# Patient Record
Sex: FEMALE | Race: OTHER | HISPANIC OR LATINO | Employment: FULL TIME | URBAN - METROPOLITAN AREA
[De-identification: names, ages, dates, MRNs, and addresses within clinical notes are randomized per-mention and may not be internally consistent; named-entity substitution may affect disease eponyms.]

---

## 2020-02-28 ENCOUNTER — OFFICE VISIT (OUTPATIENT)
Dept: FAMILY MEDICINE CLINIC | Facility: CLINIC | Age: 40
End: 2020-02-28
Payer: COMMERCIAL

## 2020-02-28 VITALS
OXYGEN SATURATION: 97 % | SYSTOLIC BLOOD PRESSURE: 120 MMHG | HEIGHT: 61 IN | BODY MASS INDEX: 25.71 KG/M2 | TEMPERATURE: 98.3 F | DIASTOLIC BLOOD PRESSURE: 80 MMHG | WEIGHT: 136.2 LBS | HEART RATE: 81 BPM

## 2020-02-28 DIAGNOSIS — Z12.4 SCREENING FOR CERVICAL CANCER: ICD-10-CM

## 2020-02-28 DIAGNOSIS — Z13.220 SCREENING FOR CHOLESTEROL LEVEL: ICD-10-CM

## 2020-02-28 DIAGNOSIS — Z13.1 ENCOUNTER FOR SCREENING EXAMINATION FOR IMPAIRED GLUCOSE REGULATION AND DIABETES MELLITUS: ICD-10-CM

## 2020-02-28 DIAGNOSIS — R53.83 OTHER FATIGUE: ICD-10-CM

## 2020-02-28 DIAGNOSIS — Z23 NEEDS FLU SHOT: ICD-10-CM

## 2020-02-28 DIAGNOSIS — Z11.4 ENCOUNTER FOR SCREENING FOR HIV: ICD-10-CM

## 2020-02-28 DIAGNOSIS — Z00.01 ENCOUNTER FOR WELL ADULT EXAM WITH ABNORMAL FINDINGS: Primary | ICD-10-CM

## 2020-02-28 PROCEDURE — 90471 IMMUNIZATION ADMIN: CPT | Performed by: NURSE PRACTITIONER

## 2020-02-28 PROCEDURE — 99395 PREV VISIT EST AGE 18-39: CPT | Performed by: NURSE PRACTITIONER

## 2020-02-28 PROCEDURE — 90686 IIV4 VACC NO PRSV 0.5 ML IM: CPT | Performed by: NURSE PRACTITIONER

## 2020-02-28 RX ORDER — PHENTERMINE HYDROCHLORIDE 37.5 MG/1
37.5 TABLET ORAL
COMMUNITY
Start: 2020-01-06

## 2020-02-28 NOTE — PROGRESS NOTES
Assessment/Plan:    Encounter for well adult exam with abnormal findings  Patient is here for physical exam we find this visit without any distress or abnormalities  We recommended exercise at least three and 0 5 hours per week and healthy diet with a low carbohydrate and low saturated fat  Began to follow up in one year for regular physical exams  BMI Counseling: Body mass index is 25 73 kg/m²  The BMI is above normal  Nutrition recommendations include reducing portion sizes, 3-5 servings of fruits/vegetables daily, consuming healthier snacks, reducing intake of saturated fat and trans fat and reducing intake of cholesterol  Exercise recommendations include moderate aerobic physical activity for 150 minutes/week  Diagnoses and all orders for this visit:    Encounter for well adult exam with abnormal findings    Screening for cervical cancer  -     Ambulatory referral to Gynecology; Future    Needs flu shot  -     influenza vaccine, [id number], quadrivalent, 0 5 mL, preservative-free, for adult and pediatric patients 6 mos+ (AFLURIA, FLUARIX, FLULAVAL, FLUZONE)    Other fatigue  -     CBC and differential; Future  -     TSH, 3rd generation with Free T4 reflex; Future    Encounter for screening examination for impaired glucose regulation and diabetes mellitus  -     Comprehensive metabolic panel; Future    Screening for cholesterol level  -     Lipid panel; Future    Encounter for screening for HIV  -     HIV 1/2 AG-AB combo; Future    Other orders  -     Cancel: Ambulatory referral to Obstetrics / Gynecology; Future  -     Cancel: TDAP VACCINE GREATER THAN OR EQUAL TO 8YO IM  -     phentermine (ADIPEX-P) 37 5 MG tablet; Take 37 5 mg by mouth daily before breakfast          Subjective:      Patient ID: Carol Allred is a 44 y o  female  44year old female patient here for a physical exam  Last dental exam is up to date, and eye exam has an eye doctor and will go see one  Eating healthy per patient   Doesn't exercise as much as she should  Walks per patient  Sleeps well at night  LMP: currently on menses  The following portions of the patient's history were reviewed and updated as appropriate: allergies, current medications, past family history, past medical history, past social history, past surgical history and problem list     Review of Systems   Constitutional: Negative  Negative for appetite change, fatigue and fever  HENT: Negative  Eyes: Negative  Respiratory: Negative  Negative for cough, shortness of breath and wheezing  Cardiovascular: Negative  Negative for chest pain and palpitations  Gastrointestinal: Negative  Negative for abdominal distention  Endocrine: Negative  Genitourinary: Negative  Musculoskeletal: Negative  Negative for arthralgias and gait problem  Skin: Negative  Allergic/Immunologic: Negative  Neurological: Negative  Negative for dizziness and headaches  Hematological: Negative  Negative for adenopathy  Does not bruise/bleed easily  Psychiatric/Behavioral: Negative  Objective:      /80 (BP Location: Left arm, Patient Position: Supine, Cuff Size: Adult)   Pulse 81   Temp 98 3 °F (36 8 °C) (Oral)   Ht 5' 1" (1 549 m)   Wt 61 8 kg (136 lb 3 2 oz)   LMP 02/27/2020 (Exact Date)   SpO2 97%   BMI 25 73 kg/m²          Physical Exam   Constitutional: She is oriented to person, place, and time  She appears well-developed and well-nourished  No distress  HENT:   Head: Normocephalic and atraumatic  Right Ear: External ear normal    Left Ear: External ear normal    Nose: Nose normal    Mouth/Throat: Oropharynx is clear and moist    Eyes: Pupils are equal, round, and reactive to light  Conjunctivae and EOM are normal  Right eye exhibits no discharge  Left eye exhibits no discharge  Neck: Normal range of motion  Neck supple  No thyromegaly present     Cardiovascular: Normal rate, regular rhythm, normal heart sounds and intact distal pulses  Exam reveals no gallop and no friction rub  No murmur heard  Pulmonary/Chest: Effort normal and breath sounds normal  No respiratory distress  She has no wheezes  Abdominal: Soft  Bowel sounds are normal  She exhibits no distension  There is no tenderness  Musculoskeletal: Normal range of motion  Neurological: She is alert and oriented to person, place, and time  She has normal reflexes  Skin: Skin is warm and dry  Capillary refill takes less than 2 seconds  She is not diaphoretic  Psychiatric: She has a normal mood and affect  Her behavior is normal  Judgment and thought content normal    Nursing note and vitals reviewed

## 2020-03-02 PROBLEM — Z00.01 ENCOUNTER FOR WELL ADULT EXAM WITH ABNORMAL FINDINGS: Status: ACTIVE | Noted: 2020-03-02

## 2020-03-20 ENCOUNTER — OFFICE VISIT (OUTPATIENT)
Dept: FAMILY MEDICINE CLINIC | Facility: CLINIC | Age: 40
End: 2020-03-20
Payer: COMMERCIAL

## 2020-03-20 VITALS
BODY MASS INDEX: 25.19 KG/M2 | SYSTOLIC BLOOD PRESSURE: 130 MMHG | HEIGHT: 61 IN | TEMPERATURE: 98.3 F | HEART RATE: 87 BPM | WEIGHT: 133.4 LBS | DIASTOLIC BLOOD PRESSURE: 80 MMHG | OXYGEN SATURATION: 97 %

## 2020-03-20 DIAGNOSIS — J30.1 NON-SEASONAL ALLERGIC RHINITIS DUE TO POLLEN: Primary | ICD-10-CM

## 2020-03-20 PROCEDURE — 3008F BODY MASS INDEX DOCD: CPT | Performed by: NURSE PRACTITIONER

## 2020-03-20 PROCEDURE — 1036F TOBACCO NON-USER: CPT | Performed by: NURSE PRACTITIONER

## 2020-03-20 PROCEDURE — 99214 OFFICE O/P EST MOD 30 MIN: CPT | Performed by: NURSE PRACTITIONER

## 2020-03-20 NOTE — PROGRESS NOTES
Assessment/Plan:    Non-seasonal allergic rhinitis due to pollen  I am recommending patient avoid triggers  Minimize exposure to irritants like smoke, perfumes, cosmetics, hair spray, and other odors  Use of nasal saline spray and cleaning out nares as much as possible  Patient to take OTC flonase she has  Recommended taking a daily oral anti-histamine but patient states these at times do not work, sometimes will take steroid injections for the season  Today symptoms have improved per patient  Diagnoses and all orders for this visit:    Non-seasonal allergic rhinitis due to pollen          Subjective:      Patient ID: Oscar Schmitt is a 44 y o  female  44year old female patient here for a follow up on her recent cough and sore throat  Recently came back from [place] on Tuesday  Pt does admit to suffering from allergies and today was sneezing somewhat  Denies any fever or shortness of breath  Overall she feels well  Will continue to take flonase and daily anti-histamine  Cough   This is a new problem  The current episode started yesterday  The problem has been waxing and waning  The problem occurs constantly  The cough is productive of sputum  Associated symptoms include postnasal drip, rhinorrhea and a sore throat  Pertinent negatives include no chest pain, chills, ear congestion, ear pain, fever, headaches, heartburn, nasal congestion, shortness of breath, weight loss or wheezing  Associated symptoms comments: sneezing  The symptoms are aggravated by cold air and lying down  Risk factors for lung disease include travel ([facility] on tuesday)  She has tried OTC cough suppressant for the symptoms  The treatment provided moderate relief  Her past medical history is significant for environmental allergies  There is no history of asthma, bronchitis or pneumonia  Sore Throat    This is a new problem  The current episode started yesterday  The problem has been gradually worsening  There has been no fever  The fever has been present for less than 1 day  The pain is at a severity of 0/10  The patient is experiencing no pain  Associated symptoms include coughing  Pertinent negatives include no ear discharge, ear pain, headaches, shortness of breath, swollen glands, trouble swallowing or vomiting  She has had no exposure to strep or mono  She has tried cool liquids for the symptoms  The treatment provided moderate relief  The following portions of the patient's history were reviewed and updated as appropriate: allergies, current medications, past family history, past medical history, past social history, past surgical history and problem list     Review of Systems   Constitutional: Negative  Negative for chills, fever and weight loss  HENT: Positive for postnasal drip, rhinorrhea and sore throat  Negative for ear discharge, ear pain and trouble swallowing  Eyes: Negative  Respiratory: Positive for cough  Negative for chest tightness, shortness of breath and wheezing  Cardiovascular: Negative  Negative for chest pain and palpitations  Gastrointestinal: Negative  Negative for abdominal distention, heartburn and vomiting  Endocrine: Negative  Genitourinary: Negative  Musculoskeletal: Negative  Negative for arthralgias  Skin: Negative  Allergic/Immunologic: Positive for environmental allergies  Neurological: Negative  Negative for headaches  Hematological: Negative  Negative for adenopathy  Does not bruise/bleed easily  Psychiatric/Behavioral: Negative  Objective:      /80 (BP Location: Left arm, Patient Position: Sitting, Cuff Size: Adult)   Pulse 87   Temp 98 3 °F (36 8 °C) (Oral)   Ht 5' 1" (1 549 m)   Wt 60 5 kg (133 lb 6 4 oz)   LMP 02/27/2020 (Exact Date)   SpO2 97%   BMI 25 21 kg/m²          Physical Exam   Constitutional: She is oriented to person, place, and time  She appears well-developed and well-nourished  Non-toxic appearance   She does not appear ill  No distress  HENT:   Head: Normocephalic and atraumatic  Right Ear: Hearing and external ear normal  A middle ear effusion is present  Left Ear: Hearing and external ear normal  A middle ear effusion is present  Nose: Rhinorrhea present  Mouth/Throat: Uvula is midline  Mucous membranes are pale  Posterior oropharyngeal erythema present  Eyes: Pupils are equal, round, and reactive to light  EOM are normal    Neck: Normal range of motion  Neck supple  Cardiovascular: Normal rate, regular rhythm and normal heart sounds  Pulmonary/Chest: Effort normal and breath sounds normal  No respiratory distress  She has no wheezes  She has no rales  Abdominal: Soft  Bowel sounds are normal    Musculoskeletal: Normal range of motion  Lymphadenopathy:     She has no cervical adenopathy  Neurological: She is alert and oriented to person, place, and time  She has normal reflexes  Skin: Skin is warm and dry  Capillary refill takes less than 2 seconds  Psychiatric: She has a normal mood and affect  Her behavior is normal  Thought content normal    Nursing note and vitals reviewed

## 2020-03-20 NOTE — LETTER
March 20, 2020     Patient: Hulda Mcburney   YOB: 1980   Date of Visit: 3/20/2020       To Whom it May Concern:    Faustino Santos is under my professional care  She was seen in my office on 3/20/2020  She may return to work on 3/23/20 without any restrictions to full duty  If you have any questions or concerns, please don't hesitate to call           Sincerely,          SALVADOR Chanel        CC: No Recipients

## 2020-03-20 NOTE — ASSESSMENT & PLAN NOTE
I am recommending patient avoid triggers  Minimize exposure to irritants like smoke, perfumes, cosmetics, hair spray, and other odors  Use of nasal saline spray and cleaning out nares as much as possible  Patient to take OTC flonase she has  Recommended taking a daily oral anti-histamine but patient states these at times do not work, sometimes will take steroid injections for the season  Today symptoms have improved per patient

## 2020-03-23 LAB
ALBUMIN SERPL-MCNC: 4.3 G/DL (ref 3.6–5.1)
ALBUMIN/GLOB SERPL: 1.4 (CALC) (ref 1–2.5)
ALP SERPL-CCNC: 64 U/L (ref 31–125)
ALT SERPL-CCNC: 24 U/L (ref 6–29)
AST SERPL-CCNC: 16 U/L (ref 10–30)
BASOPHILS # BLD AUTO: 43 CELLS/UL (ref 0–200)
BASOPHILS NFR BLD AUTO: 0.5 %
BILIRUB SERPL-MCNC: 0.5 MG/DL (ref 0.2–1.2)
BUN SERPL-MCNC: 13 MG/DL (ref 7–25)
BUN/CREAT SERPL: NORMAL (CALC) (ref 6–22)
CALCIUM SERPL-MCNC: 9.1 MG/DL (ref 8.6–10.2)
CHLORIDE SERPL-SCNC: 105 MMOL/L (ref 98–110)
CHOLEST SERPL-MCNC: 169 MG/DL
CHOLEST/HDLC SERPL: 3.2 (CALC)
CO2 SERPL-SCNC: 27 MMOL/L (ref 20–32)
CREAT SERPL-MCNC: 0.85 MG/DL (ref 0.5–1.1)
EOSINOPHIL # BLD AUTO: 102 CELLS/UL (ref 15–500)
EOSINOPHIL NFR BLD AUTO: 1.2 %
ERYTHROCYTE [DISTWIDTH] IN BLOOD BY AUTOMATED COUNT: 12.4 % (ref 11–15)
GLOBULIN SER CALC-MCNC: 3.1 G/DL (CALC) (ref 1.9–3.7)
GLUCOSE SERPL-MCNC: 92 MG/DL (ref 65–99)
HCT VFR BLD AUTO: 41.4 % (ref 35–45)
HDLC SERPL-MCNC: 53 MG/DL
HGB BLD-MCNC: 13.8 G/DL (ref 11.7–15.5)
HIV 1+2 AB+HIV1 P24 AG SERPL QL IA: NORMAL
LDLC SERPL CALC-MCNC: 96 MG/DL (CALC)
LYMPHOCYTES # BLD AUTO: 2185 CELLS/UL (ref 850–3900)
LYMPHOCYTES NFR BLD AUTO: 25.7 %
MCH RBC QN AUTO: 30.1 PG (ref 27–33)
MCHC RBC AUTO-ENTMCNC: 33.3 G/DL (ref 32–36)
MCV RBC AUTO: 90.2 FL (ref 80–100)
MONOCYTES # BLD AUTO: 408 CELLS/UL (ref 200–950)
MONOCYTES NFR BLD AUTO: 4.8 %
NEUTROPHILS # BLD AUTO: 5763 CELLS/UL (ref 1500–7800)
NEUTROPHILS NFR BLD AUTO: 67.8 %
NONHDLC SERPL-MCNC: 116 MG/DL (CALC)
PLATELET # BLD AUTO: 321 THOUSAND/UL (ref 140–400)
PMV BLD REES-ECKER: 10.6 FL (ref 7.5–12.5)
POTASSIUM SERPL-SCNC: 4.9 MMOL/L (ref 3.5–5.3)
PROT SERPL-MCNC: 7.4 G/DL (ref 6.1–8.1)
RBC # BLD AUTO: 4.59 MILLION/UL (ref 3.8–5.1)
SL AMB EGFR AFRICAN AMERICAN: 100 ML/MIN/1.73M2
SL AMB EGFR NON AFRICAN AMERICAN: 86 ML/MIN/1.73M2
SODIUM SERPL-SCNC: 138 MMOL/L (ref 135–146)
TRIGL SERPL-MCNC: 108 MG/DL
TSH SERPL-ACNC: 1.61 MIU/L
WBC # BLD AUTO: 8.5 THOUSAND/UL (ref 3.8–10.8)

## 2020-12-10 ENCOUNTER — LAB (OUTPATIENT)
Dept: LAB | Facility: HOSPITAL | Age: 40
End: 2020-12-10
Payer: COMMERCIAL

## 2020-12-10 ENCOUNTER — TRANSCRIBE ORDERS (OUTPATIENT)
Dept: LAB | Facility: HOSPITAL | Age: 40
End: 2020-12-10

## 2020-12-10 DIAGNOSIS — R53.83 OTHER FATIGUE: ICD-10-CM

## 2020-12-10 DIAGNOSIS — Z11.4 ENCOUNTER FOR SCREENING FOR HIV: ICD-10-CM

## 2020-12-10 DIAGNOSIS — Z13.220 SCREENING FOR CHOLESTEROL LEVEL: ICD-10-CM

## 2020-12-10 DIAGNOSIS — Z01.812 PRE-OPERATIVE LABORATORY EXAMINATION: Primary | ICD-10-CM

## 2020-12-10 DIAGNOSIS — Z13.1 ENCOUNTER FOR SCREENING EXAMINATION FOR IMPAIRED GLUCOSE REGULATION AND DIABETES MELLITUS: ICD-10-CM

## 2020-12-10 LAB
ALBUMIN SERPL BCP-MCNC: 4.2 G/DL (ref 3–5.2)
ALP SERPL-CCNC: 60 U/L (ref 43–122)
ALT SERPL W P-5'-P-CCNC: 16 U/L (ref 9–52)
ANION GAP SERPL CALCULATED.3IONS-SCNC: 7 MMOL/L (ref 5–14)
AST SERPL W P-5'-P-CCNC: 19 U/L (ref 14–36)
B-HCG SERPL-ACNC: <3 MIU/ML
BASOPHILS # BLD AUTO: 0.1 THOUSANDS/ΜL (ref 0–0.1)
BASOPHILS NFR BLD AUTO: 1 % (ref 0–1)
BILIRUB SERPL-MCNC: 0.4 MG/DL
BUN SERPL-MCNC: 17 MG/DL (ref 5–25)
CALCIUM SERPL-MCNC: 9 MG/DL (ref 8.4–10.2)
CHLORIDE SERPL-SCNC: 102 MMOL/L (ref 97–108)
CO2 SERPL-SCNC: 29 MMOL/L (ref 22–30)
CREAT SERPL-MCNC: 0.7 MG/DL (ref 0.6–1.2)
EOSINOPHIL # BLD AUTO: 0.1 THOUSAND/ΜL (ref 0–0.4)
EOSINOPHIL NFR BLD AUTO: 1 % (ref 0–6)
ERYTHROCYTE [DISTWIDTH] IN BLOOD BY AUTOMATED COUNT: 12.6 %
GFR SERPL CREATININE-BSD FRML MDRD: 109 ML/MIN/1.73SQ M
GLUCOSE SERPL-MCNC: 86 MG/DL (ref 70–99)
HCT VFR BLD AUTO: 37.7 % (ref 36–46)
HGB BLD-MCNC: 13 G/DL (ref 12–16)
LYMPHOCYTES # BLD AUTO: 2.3 THOUSANDS/ΜL (ref 0.5–4)
LYMPHOCYTES NFR BLD AUTO: 32 % (ref 25–45)
MCH RBC QN AUTO: 30.9 PG (ref 26–34)
MCHC RBC AUTO-ENTMCNC: 34.5 G/DL (ref 31–36)
MCV RBC AUTO: 90 FL (ref 80–100)
MONOCYTES # BLD AUTO: 0.4 THOUSAND/ΜL (ref 0.2–0.9)
MONOCYTES NFR BLD AUTO: 6 % (ref 1–10)
NEUTROPHILS # BLD AUTO: 4.3 THOUSANDS/ΜL (ref 1.8–7.8)
NEUTS SEG NFR BLD AUTO: 60 % (ref 45–65)
PLATELET # BLD AUTO: 362 THOUSANDS/UL (ref 150–450)
PMV BLD AUTO: 8.1 FL (ref 8.9–12.7)
POTASSIUM SERPL-SCNC: 3.9 MMOL/L (ref 3.6–5)
PROT SERPL-MCNC: 7.8 G/DL (ref 5.9–8.4)
RBC # BLD AUTO: 4.21 MILLION/UL (ref 4–5.2)
SODIUM SERPL-SCNC: 138 MMOL/L (ref 137–147)
TSH SERPL DL<=0.05 MIU/L-ACNC: 1.39 UIU/ML (ref 0.47–4.68)
WBC # BLD AUTO: 7.2 THOUSAND/UL (ref 4.5–11)

## 2020-12-10 PROCEDURE — 84443 ASSAY THYROID STIM HORMONE: CPT

## 2020-12-10 PROCEDURE — 85025 COMPLETE CBC W/AUTO DIFF WBC: CPT

## 2020-12-10 PROCEDURE — 80053 COMPREHEN METABOLIC PANEL: CPT

## 2020-12-10 PROCEDURE — 87389 HIV-1 AG W/HIV-1&-2 AB AG IA: CPT

## 2020-12-10 PROCEDURE — 36415 COLL VENOUS BLD VENIPUNCTURE: CPT

## 2020-12-10 PROCEDURE — 84702 CHORIONIC GONADOTROPIN TEST: CPT

## 2020-12-11 LAB — HIV 1+2 AB+HIV1 P24 AG SERPL QL IA: NORMAL

## 2021-01-03 ENCOUNTER — OFFICE VISIT (OUTPATIENT)
Dept: URGENT CARE | Age: 41
End: 2021-01-03
Payer: COMMERCIAL

## 2021-01-03 VITALS
WEIGHT: 140 LBS | OXYGEN SATURATION: 99 % | HEIGHT: 61 IN | BODY MASS INDEX: 26.43 KG/M2 | HEART RATE: 82 BPM | RESPIRATION RATE: 16 BRPM | TEMPERATURE: 97.7 F

## 2021-01-03 DIAGNOSIS — R43.0 LOSS OF SMELL: Primary | ICD-10-CM

## 2021-01-03 PROCEDURE — U0003 INFECTIOUS AGENT DETECTION BY NUCLEIC ACID (DNA OR RNA); SEVERE ACUTE RESPIRATORY SYNDROME CORONAVIRUS 2 (SARS-COV-2) (CORONAVIRUS DISEASE [COVID-19]), AMPLIFIED PROBE TECHNIQUE, MAKING USE OF HIGH THROUGHPUT TECHNOLOGIES AS DESCRIBED BY CMS-2020-01-R: HCPCS | Performed by: PHYSICIAN ASSISTANT

## 2021-01-03 PROCEDURE — 99213 OFFICE O/P EST LOW 20 MIN: CPT | Performed by: PHYSICIAN ASSISTANT

## 2021-01-03 NOTE — LETTER
January 3, 2021     Patient: Steffi Zayas   YOB: 1980   Date of Visit: 1/3/2021       To Whom It May Concern: It is my medical opinion that Hanna Homans should remain out of work until cleared by physician  If you have any questions or concerns, please don't hesitate to call           Sincerely,        Joslyn Parra PA-C    CC: No Recipients

## 2021-01-03 NOTE — PATIENT INSTRUCTIONS
Patient Instructions   COVID testing initiated  Results may take up to 5-10 days to return, but often come back sooner (2-4 days)     If the patient has a [facility] My Chart account, results may be accessed on line  If the patient does not have the [facility] Chart account, please establish one so results can be accessed  This will be the easiest and quickest way to get a copy of your test results if you require printed documentation  If patient is symptomatic and until results are obtained, home quarantine / self isolation strongly encouraged  If testing is done for screening purposes and patient is not symptomatic, we still recommend masking, social distancing, good hygiene practices be followed  If COVID test is positive, patient / care giver will be contacted by ordering provider or designated staff  If COVID test is positive, please call the primary care provider office to inform of positive test and request follow up evaluation appointment  (Generally, primary care providers are doing telemedicine visits with their positive COVID patients )  If COVID test is positive, please again review all information below  Further questions may be addressed by the primary care provider or the [address] [place] [address] [facility] at [phone]  If the patient / caregiver has not heard about test results or has been unable to access results on the patient My Chart account in a timely fashion, please call the provider's office where test was ordered (or Hot Line if applicable)  to inquire about results  If results are negative and patient / care giver has been found to have already accessed results through the [facility]  [employer] [facility] tat[facility]na, no call will be made  Until results are obtained, home quarantine / self isolation strongly encouraged       If the patient would develop profound weakness, chest pain, shortness of breath please proceed to an emergency room for further evaluation otherwise we do recommend that patient follow-up with their primary care provider in the next 5-7 days if not improving  Symptomatic treatment as needed for symptoms relief based on age / medical status of patient  Things like warm salt water gargles, Tylenol or Ibuprofen (if not contraindicated), drinking plenty of fluids, nasal saline rinses / spray, warm tea with honey (not for patients less than 1 year of age),  etc may provide symptoms relief  [address]     Your healthcare provider and/or public health staff have evaluated you and have determined that you do not need to remain in the hospital at this time  At this time you can be isolated at home where you will be monitored by staff from your local or state health department  You should carefully follow the prevention and isolation steps below until a healthcare provider or local or state health department says that you can return to your normal activities  Stay home except to get medical care     People who are mildly ill with COVID-19 are able to isolate at home during their illness  You should restrict activities outside your home, except for getting medical care  Do not go to work, school, or public areas  Avoid using public transportation, ride-sharing, or taxis  Separate yourself from other people and animals in your home     People: As much as possible, you should stay in a specific room and away from other people in your home  Also, you should use a separate bathroom, if available  Animals: You should restrict contact with pets and other animals while you are sick with COVID-19, just like you would around other people  Although there have not been reports of pets or other animals becoming sick with COVID-19, it is still recommended that people sick with COVID-19 limit contact with animals until more information is known about the virus   When possible, have another member of your household care for your animals while you are sick  If you are sick with COVID-19, avoid contact with your pet, including petting, snuggling, being kissed or licked, and sharing food  If you must care for your pet or be around animals while you are sick, wash your hands before and after you interact with pets and wear a facemask  See COVID-19 and Animals for more information  Call ahead before visiting your doctor     If you have a medical appointment, call the healthcare provider and tell them that you have or may have COVID-19  This will help the healthcare providers office take steps to keep other people from getting infected or exposed  Wear a facemask     You should wear a facemask when you are around other people (e g , sharing a room or vehicle) or pets and before you enter a healthcare providers office  If you are not able to wear a facemask (for example, because it causes trouble breathing), then people who live with you should not stay in the same room with you, or they should wear a facemask if they enter your room  Cover your coughs and sneezes     Cover your mouth and nose with a tissue when you cough or sneeze  Throw used tissues in a lined trash can  Immediately wash your hands with soap and water for at least 20 seconds or, if soap and water are not available, clean your hands with an alcohol-based hand  that contains at least 60% alcohol  Clean your hands often     Wash your hands often with soap and water for at least 20 seconds, especially after blowing your nose, coughing, or sneezing; going to the bathroom; and before eating or preparing food  If soap and water are not readily available, use an alcohol-based hand  with at least 60% alcohol, covering all surfaces of your hands and rubbing them together until they feel dry  Soap and water are the best option if hands are visibly dirty  Avoid touching your eyes, nose, and mouth with unwashed hands       Avoid sharing personal household items     You should not share dishes, drinking glasses, cups, eating utensils, towels, or bedding with other people or pets in your home  After using these items, they should be washed thoroughly with soap and water  Clean all high-touch surfaces everyday     High touch surfaces include counters, tabletops, doorknobs, bathroom fixtures, toilets, phones, keyboards, tablets, and bedside tables  Also, clean any surfaces that may have blood, stool, or body fluids on them  Use a household cleaning spray or wipe, according to the label instructions  Labels contain instructions for safe and effective use of the cleaning product including precautions you should take when applying the product, such as wearing gloves and making sure you have good ventilation during use of the product  Monitor your symptoms     Seek prompt medical attention if your illness is worsening (e g , difficulty breathing)  Before seeking care, call your healthcare provider and tell them that you have, or are being evaluated for, COVID-19  Put on a facemask before you enter the facility  These steps will help the healthcare providers office to keep other people in the office or waiting room from getting infected or exposed  Ask your healthcare provider to call the local or [facility] health department  Persons who are placed under active monitoring or facilitated self-monitoring should follow instructions provided by their local health department or occupational health professionals, as appropriate  If you have a medical emergency and need to call 911, notify the dispatch personnel that you have, or are being evaluated for COVID-19  If possible, put on a facemask before emergency medical services arrive       Discontinuing home isolation     Patients with confirmed COVID-19 should remain under home isolation precautions until the following conditions are met:   § They have had no fever for at least 24 hours (that is one full day of no fever without the use medicine that reduces fevers)  AND  § other symptoms have improved (for example, when their cough or shortness of breath have improved)  AND  § at least 10 days have passed since their symptoms first appeared     Patients with confirmed COVID-19 should also notify close contacts (including their workplace) and ask that they self-quarantine  Currently, close contact is defined as being within 6 feet for for a cumulative total of 15 minutes or more over a 24 hour period starting from 2 days before illness onset  (or, for asymptomatic patients, 2 days prior to test specimen collection)  Close contacts of patients diagnosed with COVID-19 should be instructed by the patient to self-quarantine for 14 days from the last time of their last contact with the patient        Source: [employer]

## 2021-01-04 NOTE — PROGRESS NOTES
[address]        NAME: Stas Lovett is a 36 y o  female  : 1980    MRN: [id number]  DATE: January 3, 2021  TIME: 7:14 PM    Assessment and Plan   Loss of smell [R43 0]  1  Loss of smell  Novel Coronavirus (COVID-19), PCR [facility] - Office Collection         Patient Instructions       Continue to monitor symptoms  If new or worsening symptoms develop, go immediately to Er  Drink plenty of fluids  Follow up with Family Doctor this week  Chief Complaint     Chief Complaint   Patient presents with    COVID-19     complains of sore throat, loss of tasted and family member recently tested positive for covid         History of Present Illness       Cough  This is a new problem  Episode onset: 2 days ago  The problem has been unchanged  The problem occurs every few minutes  The cough is non-productive  Associated symptoms include nasal congestion, postnasal drip and a sore throat  Pertinent negatives include no chest pain, chills, ear pain, fever, headaches, myalgias, rash, rhinorrhea, shortness of breath, sweats or wheezing  Nothing aggravates the symptoms  She has tried nothing for the symptoms    (+)Sick contact 4 days ago  Review of Systems   Review of Systems   Constitutional: Negative for chills, diaphoresis, fatigue and fever  HENT: Positive for postnasal drip, sinus pressure, sinus pain, sneezing and sore throat  Negative for congestion, ear pain, rhinorrhea and voice change  Eyes: Negative  Respiratory: Positive for cough  Negative for chest tightness, shortness of breath and wheezing  Cardiovascular: Negative for chest pain and palpitations  Gastrointestinal: Negative for abdominal pain, constipation, diarrhea, nausea and vomiting  Endocrine: Negative  Genitourinary: Negative for dysuria  Musculoskeletal: Negative for back pain, myalgias and neck pain  Skin: Negative for pallor and rash  Allergic/Immunologic: Negative      Neurological: Negative for dizziness, syncope and headaches  Hematological: Negative  Psychiatric/Behavioral: Negative  Current Medications       Current Outpatient Medications:     phentermine (ADIPEX-P) 37 5 MG tablet, Take 37 5 mg by mouth daily before breakfast, Disp: , Rfl:     Current Allergies     Allergies as of 01/03/2021 - Reviewed 01/03/2021   Allergen Reaction Noted    No active allergies  06/08/2017            The following portions of the patient's history were reviewed and updated as appropriate: allergies, current medications, past family history, past medical history, past social history, past surgical history and problem list      History reviewed  No pertinent past medical history  Past Surgical History:   Procedure Laterality Date    APPENDECTOMY      BODY LIFT LOWER N/A 9/30/2016    Procedure: BODY LIFT Alvira Sat BUTT LIFT ;  Surgeon: Jessica Lopez MD;  Location: [facility];  Service:     MA SUCT SERINA LIPECTOMY,UP EXTREM Bilateral 9/30/2016    Procedure: LIPOSUCTION ARM;  Surgeon: Jessica Lopez MD;  Location: [facility];  Service: Plastics    WISDOM TOOTH EXTRACTION         Family History   Problem Relation Age of Onset    Coronary artery disease Mother          Medications have been verified  Objective   Pulse 82   Temp 97 7 °F (36 5 °C)   Resp 16   Ht 5' 1" (1 549 m)   Wt 63 5 kg (140 lb)   SpO2 99%   BMI 26 45 kg/m²        Physical Exam     Physical Exam  Vitals signs and nursing note reviewed  Constitutional:       General: She is not in acute distress  Appearance: Normal appearance  She is well-developed  She is not ill-appearing or diaphoretic  HENT:      Head: Normocephalic and atraumatic  Right Ear: External ear normal       Left Ear: External ear normal       Nose: Congestion present  No rhinorrhea  Mouth/Throat:      Pharynx: Posterior oropharyngeal erythema present  No oropharyngeal exudate  Eyes:      General:         Right eye: No discharge           Left eye: No discharge  Conjunctiva/sclera: Conjunctivae normal    Neck:      Musculoskeletal: Normal range of motion and neck supple  Cardiovascular:      Rate and Rhythm: Normal rate and regular rhythm  Heart sounds: Normal heart sounds  Pulmonary:      Effort: Pulmonary effort is normal  No respiratory distress  Breath sounds: Normal breath sounds  No wheezing, rhonchi or rales  Lymphadenopathy:      Cervical: No cervical adenopathy  Skin:     General: Skin is warm  Capillary Refill: Capillary refill takes less than 2 seconds  Findings: No rash  Neurological:      Mental Status: She is alert

## 2021-01-05 LAB — SARS-COV-2 RNA SPEC QL NAA+PROBE: NOT DETECTED

## 2021-04-16 ENCOUNTER — OFFICE VISIT (OUTPATIENT)
Dept: FAMILY MEDICINE CLINIC | Facility: CLINIC | Age: 41
End: 2021-04-16
Payer: COMMERCIAL

## 2021-04-16 VITALS
OXYGEN SATURATION: 99 % | BODY MASS INDEX: 26.24 KG/M2 | HEIGHT: 61 IN | SYSTOLIC BLOOD PRESSURE: 126 MMHG | HEART RATE: 68 BPM | RESPIRATION RATE: 16 BRPM | TEMPERATURE: 97.9 F | DIASTOLIC BLOOD PRESSURE: 80 MMHG | WEIGHT: 139 LBS

## 2021-04-16 DIAGNOSIS — K59.04 CHRONIC IDIOPATHIC CONSTIPATION: ICD-10-CM

## 2021-04-16 DIAGNOSIS — L65.9 ALOPECIA: ICD-10-CM

## 2021-04-16 DIAGNOSIS — Z00.01 ENCOUNTER FOR GENERAL ADULT MEDICAL EXAMINATION WITH ABNORMAL FINDINGS: Primary | ICD-10-CM

## 2021-04-16 DIAGNOSIS — R20.2 RIGHT HAND PARESTHESIA: ICD-10-CM

## 2021-04-16 PROCEDURE — 99396 PREV VISIT EST AGE 40-64: CPT | Performed by: PHYSICIAN ASSISTANT

## 2021-04-16 PROCEDURE — 1036F TOBACCO NON-USER: CPT | Performed by: PHYSICIAN ASSISTANT

## 2021-04-16 PROCEDURE — 3008F BODY MASS INDEX DOCD: CPT | Performed by: PHYSICIAN ASSISTANT

## 2021-04-16 PROCEDURE — 3725F SCREEN DEPRESSION PERFORMED: CPT | Performed by: PHYSICIAN ASSISTANT

## 2021-04-16 RX ORDER — TOPIRAMATE 25 MG/1
TABLET ORAL
COMMUNITY
Start: 2021-03-16

## 2021-04-16 NOTE — ASSESSMENT & PLAN NOTE
Kristi reports that she only has a bowel movement about once a week  This is how she has always been, and it does not cause her any distress  She takes fiber gummies as needed, which help  She admits that she probably does not get enough fiber in her diet or drink enough water  I recommended increasing intake of vegetables, increasing water intake, and getting regular exercise to help her move her bowels more regularly

## 2021-04-16 NOTE — ASSESSMENT & PLAN NOTE
Kristi has noticed thinning of hair at the crown for the past few years  Her gynecologist took a biopsy and has ordered labs to try to determine the cause of hair loss  The labs that I am able to see include DHEA, testosterone, FSH, LH, and TSH, and all are within normal limits  I will hold off on pursuing additional workup or initiating treatment, as she is following with her gynecologist regarding this problem  However, I encouraged Kristi to feel free to reach out to our office at any point if she wants to, as I would be happy to offer suggestions for further lab testing or treatment options

## 2021-04-16 NOTE — ASSESSMENT & PLAN NOTE
Kristi has occasional numbness, tingling, and a sensation of cramping/muscle spasm in her R hand  The pain does not bother her while she is sleeping, and she denies weakness  She does a lot of work with her hands for work, and does many repetitive movements with her hands and wrists  She is right-handed  I recommended trying to limit the movements that exacerbate her symptoms as much as possible  She can take OTC ibuprofen or naproxen to help reduce inflammation from overuse  If her symptoms worsen, consider nerve conduction studies to confirm diagnosis of carpal tunnel and referral to orthopedics

## 2021-04-16 NOTE — ASSESSMENT & PLAN NOTE
Kristi reports that she gained some weight during the pandemic, and has been more sedentary than in the past  Her gynecologist has been prescribing phentermine for her for 2-3 years to help with weight loss  She has been taking it intermittently (she will take it regularly for a few months, then stop for a few months, then start again) and reports that whenever she stops taking it, she gains the weight back that she lost while taking the medication  Her gynecologist has also recently started her on topiramate, also to help with weight loss; she has been taking the topiramate for two months  She reports that she is tolerating both medications well and is having no side effects  We discussed the use of phentermine and its addictive nature  It is not a good long-term solution for weight loss; lifestyle modifications are the mainstay of treatment  I do not recommend the use of phentermine for more than a few months  Implementing a healthy diet and getting regular exercise is the best and most healthy way to lose weight and keep it off  We discussed healthy eating strategies, as well as the importance of exercise for both physical and mental health  She verbalized understanding and will try to make some good lifestyle changes to lose weight naturally

## 2021-04-19 ENCOUNTER — TELEPHONE (OUTPATIENT)
Dept: ADMINISTRATIVE | Facility: OTHER | Age: 41
End: 2021-04-19

## 2021-04-19 NOTE — TELEPHONE ENCOUNTER
----- Message from Claudene Kenner, RN sent at 4/16/2021  2:05 PM EDT -----  Regarding: Ashley Field  04/16/21 2:05 PM    Hello, our patient Donny Alcocer has had Mammogram completed/performed  Please assist in updating the patient chart by pulling the Care Everywhere (CE) document  The date of service is 4/2021       Thank you,  Claudene Kenner, RN   [address]  [address]

## 2021-04-19 NOTE — TELEPHONE ENCOUNTER
Upon review of the In Basket request we were able to locate, review, and update the patient chart as requested for Mammogram     Any additional questions or concerns should be emailed to the Practice Liaisons via [email or web address] email, please do not reply via In Basket      Thank you  Percy Malagon MA

## 2021-09-22 ENCOUNTER — OFFICE VISIT (OUTPATIENT)
Dept: URGENT CARE | Age: 41
End: 2021-09-22
Payer: COMMERCIAL

## 2021-09-22 VITALS
BODY MASS INDEX: 26.24 KG/M2 | TEMPERATURE: 100.8 F | RESPIRATION RATE: 18 BRPM | WEIGHT: 139 LBS | OXYGEN SATURATION: 100 % | HEART RATE: 74 BPM | HEIGHT: 61 IN

## 2021-09-22 DIAGNOSIS — Z11.59 SPECIAL SCREENING EXAMINATION FOR UNSPECIFIED VIRAL DISEASE: Primary | ICD-10-CM

## 2021-09-22 DIAGNOSIS — R50.9 FEVER, UNSPECIFIED FEVER CAUSE: ICD-10-CM

## 2021-09-22 PROCEDURE — 99213 OFFICE O/P EST LOW 20 MIN: CPT | Performed by: NURSE PRACTITIONER

## 2021-09-22 PROCEDURE — U0003 INFECTIOUS AGENT DETECTION BY NUCLEIC ACID (DNA OR RNA); SEVERE ACUTE RESPIRATORY SYNDROME CORONAVIRUS 2 (SARS-COV-2) (CORONAVIRUS DISEASE [COVID-19]), AMPLIFIED PROBE TECHNIQUE, MAKING USE OF HIGH THROUGHPUT TECHNOLOGIES AS DESCRIBED BY CMS-2020-01-R: HCPCS | Performed by: NURSE PRACTITIONER

## 2021-09-22 PROCEDURE — U0005 INFEC AGEN DETEC AMPLI PROBE: HCPCS | Performed by: NURSE PRACTITIONER

## 2021-09-22 NOTE — PROGRESS NOTES
NAME: Holli Laboy is a 39 y o  female  : 1980    MRN: [id number]    Pulse 74   Temp (!) 100 8 °F (38 2 °C)   Resp 18   Ht 5' 1" (1 549 m)   Wt 63 kg (139 lb)   SpO2 100%   BMI 26 26 kg/m²     Assessment and Plan   Special screening examination for unspecified viral disease [Z11 59]  1  Special screening examination for unspecified viral disease  Novel Coronavirus (Covid-19),PCR [facility]   2  Fever, unspecified fever cause         Iris was seen today for covid-19  Diagnoses and all orders for this visit:    Special screening examination for unspecified viral disease  -     Novel Coronavirus (Covid-19),PCR [facility]    Fever, unspecified fever cause        Patient Instructions   Patient Instructions   Take zyrtec, allegra, or Claritin daily  Use flonase 1-2 sprays in each nare daily   Use nasal saline to the nose,   Use humidifer in room  Symptoms worsen go to [address]    Your healthcare provider and/or public health staff have evaluated you and have determined that you do not need to remain in the hospital at this time  At this time you can be isolated at home where you will be monitored by staff from your local or state health department  You should carefully follow the prevention and isolation steps below until a healthcare provider or local or state health department says that you can return to your normal activities  Stay home except to get medical care    People who are mildly ill with COVID-19 are able to isolate at home during their illness  You should restrict activities outside your home, except for getting medical care  Do not go to work, school, or public areas  Avoid using public transportation, ride-sharing, or taxis  Separate yourself from other people and animals in your home    People: As much as possible, you should stay in a specific room and away from other people in your home  Also, you should use a separate bathroom, if available    Animals: You should restrict contact with pets and other animals while you are sick with COVID-19, just like you would around other people  Although there have not been reports of pets or other animals becoming sick with COVID-19, it is still recommended that people sick with COVID-19 limit contact with animals until more information is known about the virus  When possible, have another member of your household care for your animals while you are sick  If you are sick with COVID-19, avoid contact with your pet, including petting, snuggling, being kissed or licked, and sharing food  If you must care for your pet or be around animals while you are sick, wash your hands before and after you interact with pets and wear a facemask  See COVID-19 and Animals for more information  Call ahead before visiting your doctor    If you have a medical appointment, call the healthcare provider and tell them that you have or may have COVID-19  This will help the healthcare providers office take steps to keep other people from getting infected or exposed  Wear a facemask    You should wear a facemask when you are around other people (e g , sharing a room or vehicle) or pets and before you enter a healthcare providers office  If you are not able to wear a facemask (for example, because it causes trouble breathing), then people who live with you should not stay in the same room with you, or they should wear a facemask if they enter your room  Cover your coughs and sneezes    Cover your mouth and nose with a tissue when you cough or sneeze  Throw used tissues in a lined trash can  Immediately wash your hands with soap and water for at least 20 seconds or, if soap and water are not available, clean your hands with an alcohol-based hand  that contains at least 60% alcohol      Clean your hands often    Wash your hands often with soap and water for at least 20 seconds, especially after blowing your nose, coughing, or sneezing; going to the bathroom; and before eating or preparing food  If soap and water are not readily available, use an alcohol-based hand  with at least 60% alcohol, covering all surfaces of your hands and rubbing them together until they feel dry  Soap and water are the best option if hands are visibly dirty  Avoid touching your eyes, nose, and mouth with unwashed hands  Avoid sharing personal household items    You should not share dishes, drinking glasses, cups, eating utensils, towels, or bedding with other people or pets in your home  After using these items, they should be washed thoroughly with soap and water  Clean all high-touch surfaces everyday    High touch surfaces include counters, tabletops, doorknobs, bathroom fixtures, toilets, phones, keyboards, tablets, and bedside tables  Also, clean any surfaces that may have blood, stool, or body fluids on them  Use a household cleaning spray or wipe, according to the label instructions  Labels contain instructions for safe and effective use of the cleaning product including precautions you should take when applying the product, such as wearing gloves and making sure you have good ventilation during use of the product  Monitor your symptoms    Seek prompt medical attention if your illness is worsening (e g , difficulty breathing)  Before seeking care, call your healthcare provider and tell them that you have, or are being evaluated for, COVID-19  Put on a facemask before you enter the facility  These steps will help the healthcare providers office to keep other people in the office or waiting room from getting infected or exposed  Ask your healthcare provider to call the local or state health department  Persons who are placed under active monitoring or facilitated self-monitoring should follow instructions provided by their local health department or occupational health professionals, as appropriate    If you have a medical emergency and need to call 911, notify the dispatch personnel that you have, or are being evaluated for COVID-19  If possible, put on a facemask before emergency medical services arrive  Discontinuing home isolation    Patients with confirmed COVID-19 should remain under home isolation precautions until the following conditions are met:   - They have had no fever for at least 24 hours (that is one full day of no fever without the use medicine that reduces fevers)  AND  - other symptoms have improved (for example, when their cough or shortness of breath have improved)  AND  - If had mild or moderate illness, at least 10 days have passed since their symptoms first appeared or if severe illness (needed oxygen) or immunosuppressed, at least 20 days have passed since symptoms first appeared  Patients with confirmed COVID-19 should also notify close contacts (including their workplace) and ask that they self-quarantine  Currently, close contact is defined as being within 6 feet for 15 minutes or more from the period 24 hours starting 48 hours before symptom onset to the time at which the patient went into isolation  Close contacts of patients diagnosed with COVID-19 should be instructed by the patient to self-quarantine for 14 days from the last time of their last contact with the patient  Source: [employer]        Proceed to the nearest ER if symptoms worsen, Follow up with your PCP  Continue to social distance, wash your hands, and wear your masks  Please continue to follow the CDC  gov guidelines daily for they are subject to change on COVID-19    Chief Complaint     Chief Complaint   Patient presents with    COVID-19     cough, faigue, muscle ache and runny nose + congestion symptoms started about 4 days ago  History of Present Illness     40 yo female here today with sinus congestion, runny nose and headache  Symptoms started on Saturday    She is taking care of her grandson over the weekend and is now worried  She is currently COVID-19 vaccinated and denies being around anyone with positive COVID  She states that she felt fevers over the weekend but did not take her temperature however she is 100 8 today in the office  She denies having any chest pain shortness of breath nausea vomiting diarrhea  Review of Systems   Review of Systems   Constitutional: Negative  HENT: Positive for postnasal drip and sore throat  Negative for congestion and ear pain  Respiratory: Positive for cough  Cardiovascular: Negative  Gastrointestinal: Negative  Genitourinary: Negative  Musculoskeletal: Negative  Skin: Negative  Neurological: Negative  Psychiatric/Behavioral: Negative  Current Medications       Current Outpatient Medications:     phentermine (ADIPEX-P) 37 5 MG tablet, Take 37 5 mg by mouth daily before breakfast, Disp: , Rfl:     topiramate (TOPAMAX) 25 mg tablet, TAKE 1 TABLET BY MOUTH 2 TIMES EVERY DAY IN THE MORNING AND EVENING, Disp: , Rfl:     Current Allergies     Allergies as of 09/22/2021    (No Known Allergies)              History reviewed  No pertinent past medical history  Past Surgical History:   Procedure Laterality Date    APPENDECTOMY      BODY LIFT LOWER N/A 9/30/2016    Procedure: BODY LIFT Jacklyn Sale BUTT LIFT ;  Surgeon: Azeem Jimenez MD;  [facility];  Service:     MO SUCT SERINA LIPECTOMY,UP EXTREM Bilateral 9/30/2016    Procedure: LIPOSUCTION ARM;  Surgeon: Azeem Jimenez MD;  [facility];  Service: Plastics    WISDOM TOOTH EXTRACTION         Family History   Problem Relation Age of Onset    Coronary artery disease Mother          Medications have been verified      The following portions of the patient's history were reviewed and updated as appropriate: allergies, current medications, past family history, past medical history, past social history, past surgical history and problem list     Objective   Pulse 74   Temp (!) 100 8 °F (38 2 °C)   Resp 18   Ht 5' 1" (1 549 m)   Wt 63 kg (139 lb)   SpO2 100%   BMI 26 26 kg/m²      Physical Exam     Physical Exam  Constitutional:       General: She is awake  Appearance: Normal appearance  She is well-developed  HENT:      Head: Normocephalic  Right Ear: Hearing, tympanic membrane, ear canal and external ear normal       Left Ear: Hearing, tympanic membrane, ear canal and external ear normal       Nose: Nose normal  No mucosal edema, congestion or rhinorrhea  Right Turbinates: Swollen  Left Turbinates: Swollen  Right Sinus: No maxillary sinus tenderness  Left Sinus: No maxillary sinus tenderness  Mouth/Throat:      Lips: Pink  Mouth: Mucous membranes are moist       Pharynx: Uvula midline  No pharyngeal swelling, oropharyngeal exudate or posterior oropharyngeal erythema  Tonsils: No tonsillar exudate  Cardiovascular:      Rate and Rhythm: Normal rate and regular rhythm  Pulmonary:      Effort: Pulmonary effort is normal       Breath sounds: Normal breath sounds and air entry  No decreased breath sounds, wheezing, rhonchi or rales  Skin:     General: Skin is warm  Capillary Refill: Capillary refill takes less than 2 seconds  Neurological:      General: No focal deficit present  Mental Status: She is alert and oriented to person, place, and time  Psychiatric:         Attention and Perception: Attention normal          Mood and Affect: Mood normal          Behavior: Behavior is cooperative  Note: Portions of this record may have been created with voice recognition software  Occasional wrong word or "sound a like" substitutions may have occurred due to the inherent limitations of voice recognition software  Please read the chart carefully and recognize, using context, where substitutions have occurred  SALVADOR Baker

## 2021-09-22 NOTE — LETTER
September 22, 2021     Patient: Holli Laboy   YOB: 1980   Date of Visit: 9/22/2021       To Whom It May Concern: It is my medical opinion that Jenna Tapia should remain out of work until labs are negative          Sincerely,        SALVADOR Crain    CC: No Recipients

## 2021-09-23 LAB — SARS-COV-2 RNA RESP QL NAA+PROBE: NEGATIVE

## 2022-01-13 ENCOUNTER — TELEMEDICINE (OUTPATIENT)
Dept: FAMILY MEDICINE CLINIC | Facility: CLINIC | Age: 42
End: 2022-01-13
Payer: COMMERCIAL

## 2022-01-13 DIAGNOSIS — B34.9 VIRAL ILLNESS: Primary | ICD-10-CM

## 2022-01-13 PROBLEM — U07.1 COVID-19: Status: RESOLVED | Noted: 2022-01-13 | Resolved: 2022-01-13

## 2022-01-13 PROBLEM — U07.1 COVID-19: Status: ACTIVE | Noted: 2022-01-13

## 2022-01-13 PROCEDURE — 1036F TOBACCO NON-USER: CPT | Performed by: PHYSICIAN ASSISTANT

## 2022-01-13 PROCEDURE — 99213 OFFICE O/P EST LOW 20 MIN: CPT | Performed by: PHYSICIAN ASSISTANT

## 2022-01-13 NOTE — PROGRESS NOTES
COVID-19 Outpatient Progress Note    Assessment/Plan:    Problem List Items Addressed This Visit        Other    Viral illness - Primary     Pt has tested negative for COVID per home antigen test  Letter written to [facility] tomorrow  Disposition:     After clarifying the patient's history, my suspicion for COVID-19 infection is very low  Home test negative for COVID; pt's daughter also tested negative  Pt needs letter to [facility] tomorrow, excusing her for Mon-Thurs missed work due to testing for [place]  Letter in [address]  I have spent 15 minutes directly with the patient  Greater than 50% of this time was spent in counseling/coordination of care regarding: patient and family education  Encounter provider Betzy Gonzalez PA-C    Provider located at [facility] AT [address]  [address] [address] [phone]    Recent Visits  No visits were found meeting these conditions  Showing recent visits within past 7 days and meeting all other requirements  Today's Visits  Date Type Provider Dept   01/13/22 Telemedicine Betzy Gonzalez PA-C [facility] today's visits and meeting all other requirements  Future Appointments  No visits were found meeting these conditions  Showing future appointments within next 150 days and meeting all other requirements     This virtual check-in was done via telephone and she agrees to proceed  Patient agrees to participate in a virtual check in via telephone or video visit instead of presenting to the office to address urgent/immediate medical needs  Patient is aware this is a billable service  After connecting through Telephone, the patient was identified by name and date of birth  Nancy Chavez was informed that this was a telemedicine visit and that the exam was being conducted confidentially over secure lines  My office door was closed   No one else was in the room  Tremaine Guzman acknowledged consent and understanding of privacy and security of the telemedicine visit  I informed the patient that I have reviewed her record in Epic and presented the opportunity for her to ask any questions regarding the visit today  The patient agreed to participate  It was my intent to perform this visit via video technology but the patient was not able to do a video connection so the visit was completed via audio telephone only  Verification of patient location:  Patient is located in the following state in which I hold an active license: [place]    Subjective:   Tremaine Guzman is a 39 y o  female who is concerned about COVID-19  Patient is currently asymptomatic  Patient denies fever, chills, fatigue, malaise, congestion, rhinorrhea, sore throat, anosmia, loss of taste, cough, shortness of breath, chest tightness, abdominal pain, nausea, vomiting, diarrhea, myalgias and headaches  - Date of symptom onset: 1/10/2022      COVID-19 vaccination status: Fully vaccinated (primary series)    Exposure:   Contact with a person who is under investigation (PUI) for or who is positive for COVID-19 within the last 14 days?: No    Hospitalized recently for fever and/or lower respiratory symptoms?: No      Currently a healthcare worker that is involved in direct patient care?: No      Works in a special setting where the risk of COVID-19 transmission may be high? (this may include long-term care, correctional and penitentiary facilities; homeless shelters; assisted-living facilities and group homes ): No      Resident in a special setting where the risk of COVID-19 transmission may be high? (this may include long-term care, correctional and penitentiary facilities; homeless shelters; assisted-living facilities and group homes ): No      Pt started with symptoms on 1/10 (cough, congestion), did home COVID test that day which was negative   Her daughter began with symptoms on 1/11 and had a PCR test; her test just came back negative today  Pt's symptoms have now resolved  Lab Results   Component Value Date    SARSCOV2 Negative 09/22/2021    SARSCOV2 Negative 05/10/2021     History reviewed  No pertinent past medical history  Past Surgical History:   Procedure Laterality Date    APPENDECTOMY      BODY LIFT LOWER N/A 9/30/2016    Procedure: BODY LIFT Laneta Stalling BUTT LIFT ;  Surgeon: Karolina Huynh MD;  [facility];  Service:     GA SUCT SERINA [address] Bilateral 9/30/2016    Procedure: LIPOSUCTION ARM;  Surgeon: Karolina Huynh MD;  [facility];  Service: Plastics    WISDOM TOOTH EXTRACTION       Current Outpatient Medications   Medication Sig Dispense Refill    phentermine (ADIPEX-P) 37 5 MG tablet Take 37 5 mg by mouth daily before breakfast      topiramate (TOPAMAX) 25 mg tablet TAKE 1 TABLET BY MOUTH 2 TIMES EVERY DAY IN THE MORNING AND EVENING       No current facility-administered medications for this visit  No Known Allergies    Review of Systems   Constitutional: Negative for chills, fatigue and fever  HENT: Negative for congestion, rhinorrhea and sore throat  Respiratory: Negative for cough, chest tightness and shortness of breath  Gastrointestinal: Negative for abdominal pain, diarrhea, nausea and vomiting  Musculoskeletal: Negative for myalgias  Neurological: Negative for headaches  Objective: There were no vitals filed for this visit  Physical Exam    VIRTUAL VISIT DISCLAIMER    Kristi [facility] verbally agrees to participate in [employer]  Pt is aware that [employer] could be limited without vital signs or the ability to perform a full hands-on physical Sandra Alvarez understands she or the provider may request at any time to terminate the video visit and request the patient to seek care or treatment in person

## 2022-01-13 NOTE — LETTER
January 13, 2022     Patient: Esa Courser   YOB: 1980   Date of Visit: 1/13/2022       To Whom it May Concern:    Darshana Méndez is under my professional care  She was seen on 1/13/2022  Please excuse her from work 1/10-1/13 due to being under investigation for COVID infection  She has tested negative for COVID-19 and may return to work on 1/14/22  If you have any questions or concerns, please don't hesitate to call           Sincerely,          Esthela White PA-C

## 2022-06-09 ENCOUNTER — OFFICE VISIT (OUTPATIENT)
Dept: FAMILY MEDICINE CLINIC | Facility: CLINIC | Age: 42
End: 2022-06-09
Payer: COMMERCIAL

## 2022-06-09 VITALS
HEIGHT: 61 IN | WEIGHT: 140.6 LBS | RESPIRATION RATE: 20 BRPM | TEMPERATURE: 97.9 F | OXYGEN SATURATION: 99 % | SYSTOLIC BLOOD PRESSURE: 118 MMHG | DIASTOLIC BLOOD PRESSURE: 70 MMHG | BODY MASS INDEX: 26.55 KG/M2 | HEART RATE: 100 BPM

## 2022-06-09 DIAGNOSIS — Z12.31 ENCOUNTER FOR SCREENING MAMMOGRAM FOR MALIGNANT NEOPLASM OF BREAST: ICD-10-CM

## 2022-06-09 DIAGNOSIS — E78.2 MIXED HYPERLIPIDEMIA: ICD-10-CM

## 2022-06-09 DIAGNOSIS — R53.83 OTHER FATIGUE: ICD-10-CM

## 2022-06-09 DIAGNOSIS — R73.9 HYPERGLYCEMIA: ICD-10-CM

## 2022-06-09 DIAGNOSIS — Z00.00 ANNUAL PHYSICAL EXAM: Primary | ICD-10-CM

## 2022-06-09 DIAGNOSIS — I83.813 VARICOSE VEINS OF BOTH LOWER EXTREMITIES WITH PAIN: ICD-10-CM

## 2022-06-09 DIAGNOSIS — Z11.59 ENCOUNTER FOR HEPATITIS C SCREENING TEST FOR LOW RISK PATIENT: ICD-10-CM

## 2022-06-09 PROCEDURE — 1036F TOBACCO NON-USER: CPT | Performed by: NURSE PRACTITIONER

## 2022-06-09 PROCEDURE — 99396 PREV VISIT EST AGE 40-64: CPT | Performed by: NURSE PRACTITIONER

## 2022-06-09 PROCEDURE — 3008F BODY MASS INDEX DOCD: CPT | Performed by: NURSE PRACTITIONER

## 2022-06-09 PROCEDURE — 3725F SCREEN DEPRESSION PERFORMED: CPT | Performed by: NURSE PRACTITIONER

## 2022-06-09 RX ORDER — KETOCONAZOLE 20 MG/ML
SHAMPOO TOPICAL
COMMUNITY
Start: 2022-05-05

## 2022-06-09 NOTE — PATIENT INSTRUCTIONS

## 2022-06-09 NOTE — PROGRESS NOTES
ADULT ANNUAL [address] [facility] PRIMARY CARE [facility]    NAME: Mario Jerry  AGE: 43 y o  SEX: female  : 1980     DATE: 2022     Assessment and Plan:     Problem List Items Addressed This Visit        Cardiovascular and Mediastinum    Varicose veins of both lower extremities with pain    Relevant Orders    Ambulatory Referral to Vascular Surgery       Other    Annual physical exam - Primary     Patient recommended healthy eating habits  Health risk assessment was discussed with patient also and the ways to stay healthier  Recommended a exercising frequently at least 5 days a week for 30 minutes at a time; such as joining a gym if not already enrolled or walking  Eating low-fat and low-cholesterol foods with avoidance of saturated fats or fried foods  Immunizations, and the need to comply with current CDC's recommendations were discussed  To follow up yearly for physical exams  Encounter for screening mammogram for malignant neoplasm of breast    Relevant Orders    Mammo screening bilateral w 3d & cad    Mixed hyperlipidemia    Relevant Orders    Lipid panel    Hyperglycemia    Relevant Orders    Comprehensive metabolic panel    Other fatigue    Relevant Orders    CBC and differential    Encounter for hepatitis C screening test for low risk patient    Relevant Orders    Hepatitis C antibody          Immunizations and preventive care screenings were discussed with patient today  Appropriate education was printed on patient's after visit summary  Counseling:  Alcohol/drug use: discussed moderation in alcohol intake, the recommendations for healthy alcohol use, and avoidance of illicit drug use  Dental Health: discussed importance of regular tooth brushing, flossing, and dental visits    Injury prevention: discussed safety/seat belts, safety helmets, smoke detectors, carbon dioxide detectors, and smoking near bedding or upholstery  Sexual health: discussed sexually transmitted diseases, partner selection, use of condoms, avoidance of unintended pregnancy, and contraceptive alternatives  · Exercise: the importance of regular exercise/physical activity was discussed  Recommend exercise 3-5 times per week for at least 30 minutes  BMI Counseling: Body mass index is 26 57 kg/m²  The BMI is above normal  Nutrition recommendations include encouraging healthy choices of fruits and vegetables, decreasing fast food intake, consuming healthier snacks, limiting drinks that contain sugar, increasing intake of lean protein, reducing intake of saturated and trans fat and reducing intake of cholesterol  Exercise recommendations include exercising 3-5 times per week  Rationale for BMI follow-up plan is due to patient being overweight or obese  Depression Screening and Follow-up Plan: Patient was screened for depression during today's encounter  They screened negative with a PHQ-2 score of 0  No follow-ups on file  Chief Complaint:     Chief Complaint   Patient presents with    Physical Exam      History of Present Illness:     Adult Annual Physical   Patient here for a comprehensive physical exam  The patient reports no problems  Diet and Physical Activity  · Diet/Nutrition: well balanced diet and consuming 3-5 servings of fruits/vegetables daily  · Exercise: walking  Depression Screening  PHQ-2/9 Depression Screening    Little interest or pleasure in doing things: 0 - not at all  Feeling down, depressed, or hopeless: 0 - not at all  PHQ-2 Score: 0  PHQ-2 Interpretation: Negative depression screen       General Health  · Sleep: sleeps well  · Hearing: normal - bilateral   · Vision: goes for regular eye exams and most recent eye exam <1 year ago  · Dental: regular dental visits and brushes teeth twice daily         /GYN Health  · Patient is: perimenopausal  · Last menstrual period: current  · Contraceptive method: IUD placement  Review of Systems:     Review of Systems   Past Medical History:     No past medical history on file  Past Surgical History:     Past Surgical History:   Procedure Laterality Date    APPENDECTOMY      BODY LIFT LOWER N/A 9/30/2016    Procedure: BODY LIFT Ruth Daniel BUTT LIFT ;  Surgeon: Carlos Boyer MD;  [facility];  Service:     WA KAITLIN SERINA [address] Bilateral 9/30/2016    Procedure: LIPOSUCTION ARM;  Surgeon: Carlos Boyer MD;  [facility];  Service: Plastics    WISDOM TOOTH EXTRACTION        Social History:     Social History     Socioeconomic History    Marital status: Single     Spouse name: None    Number of children: None    Years of education: None    Highest education level: None   Occupational History    None   Tobacco Use    Smoking status: Never Smoker    Smokeless tobacco: Never Used   Substance and Sexual Activity    Alcohol use: Yes     Comment: occas    Drug use: No    Sexual activity: Yes     Partners: Male   Other Topics Concern    None   Social History Narrative    None     Social Determinants of Health     Financial Resource Strain: Not on file   Food Insecurity: Not on file   Transportation Needs: Not on file   Physical Activity: Not on file   Stress: Not on file   Social Connections: Not on file   Intimate Partner Violence: Not on file   Housing Stability: Not on file      Family History:     Family History   Problem Relation Age of Onset    Coronary artery disease Mother       Current Medications:     Current Outpatient Medications   Medication Sig Dispense Refill    ketoconazole (NIZORAL) 2 % shampoo PLEASE SEE ATTACHED FOR DETAILED DIRECTIONS      phentermine (ADIPEX-P) 37 5 MG tablet Take 37 5 mg by mouth daily before breakfast      topiramate (TOPAMAX) 25 mg tablet TAKE 1 TABLET BY MOUTH 2 TIMES EVERY DAY IN THE MORNING AND EVENING       No current facility-administered medications for this visit        Allergies: No Known Allergies   Physical Exam:     /70 (BP Location: Left arm, Patient Position: Sitting, Cuff Size: Standard)   Pulse 100   Temp 97 9 °F (36 6 °C) (Tympanic)   Resp 20   Ht 5' 1" (1 549 m)   Wt 63 8 kg (140 lb 9 6 oz)   SpO2 99%   BMI 26 57 kg/m²     Physical Exam  Vitals and nursing note reviewed  Constitutional:       General: She is not in acute distress  Appearance: Normal appearance  She is not ill-appearing, toxic-appearing or diaphoretic  HENT:      Head: Normocephalic and atraumatic  Right Ear: Tympanic membrane, ear canal and external ear normal  There is no impacted cerumen  Left Ear: Tympanic membrane, ear canal and external ear normal  There is no impacted cerumen  Nose: Nose normal  No congestion or rhinorrhea  Mouth/Throat:      Mouth: Mucous membranes are moist       Pharynx: Oropharynx is clear  No oropharyngeal exudate or posterior oropharyngeal erythema  Eyes:      Extraocular Movements: Extraocular movements intact  Conjunctiva/sclera: Conjunctivae normal       Pupils: Pupils are equal, round, and reactive to light  Cardiovascular:      Rate and Rhythm: Normal rate and regular rhythm  Pulses: Normal pulses  Heart sounds: Normal heart sounds  Pulmonary:      Effort: Pulmonary effort is normal       Breath sounds: Normal breath sounds  Abdominal:      General: Bowel sounds are normal       Palpations: Abdomen is soft  Musculoskeletal:         General: No tenderness or deformity  Normal range of motion  Cervical back: Normal range of motion and neck supple  Skin:     General: Skin is warm and dry  Capillary Refill: Capillary refill takes less than 2 seconds  Neurological:      General: No focal deficit present  Mental Status: She is alert and oriented to person, place, and time     Psychiatric:         Mood and Affect: Mood normal          Behavior: Behavior normal           Raúl Howell [address] [place] [address]

## 2022-07-27 ENCOUNTER — CONSULT (OUTPATIENT)
Dept: VASCULAR SURGERY | Facility: CLINIC | Age: 42
End: 2022-07-27
Payer: COMMERCIAL

## 2022-07-27 VITALS
DIASTOLIC BLOOD PRESSURE: 72 MMHG | SYSTOLIC BLOOD PRESSURE: 106 MMHG | BODY MASS INDEX: 26.47 KG/M2 | HEIGHT: 61 IN | WEIGHT: 140.22 LBS | HEART RATE: 88 BPM

## 2022-07-27 DIAGNOSIS — I83.813 VARICOSE VEINS OF BOTH LOWER EXTREMITIES WITH PAIN: ICD-10-CM

## 2022-07-27 PROCEDURE — 99243 OFF/OP CNSLTJ NEW/EST LOW 30: CPT

## 2022-07-27 NOTE — LETTER
July 27, 2022     Maricarmen Bang MD  [address]  [address] [address]  [facility]  [id number]  [id number]    Patient: Morgan Moritz   YOB: 1980   Date of Visit: 7/27/2022       Dear Dr Verito Palomino: Thank you for referring Negra Fraser to me for evaluation  Below are my notes for this consultation  If you have questions, please do not hesitate to call me  I look forward to following your patient along with you  Sincerely,        Glo Doss PA-C        CC: No Recipients  Chayo Da Silva PA-C  7/27/2022  4:16 PM  Incomplete  Assessment/Plan:    Varicose veins of both lower extremities with pain  Patient is a 42-year-old female, never smoker, with no significant past medical history  She presents today for evaluation of bilateral lower extremity pain and heaviness that has been progressing over the last few months  She has been wearing occasional over-the-counter compression stockings which have been helping  She denies edema, pruritis    -No evidence of varicosities on the BLE  There are a few small areas of discoloration on the RLE  -BLE are warm, perfused without edema  Palpable pedal pulses bilaterally  -Denies hx of DVT, phlebitis, or bleeding veins    -We discussed the pathophysiology of varicose veins, current management, indications for surgical intervention   -Recommend conservative management with use of daily compression hose (20-30 mmHg), lower extremity elevation, regular exercise, OTC analgesics for pain, warm compresses, and diligent skin care  - Script for 20-30 mmHg graded compression provided today  - Follow up prn   - Instructed to call the office in the interim with any questions, concerns, or new symptoms  Diagnoses and all orders for this visit:    Varicose veins of both lower extremities with pain  -     Ambulatory Referral to Vascular Surgery  -     Compression Stocking          Subjective:      Patient ID: Morgan Moritz is a 43 y o  female        [facility]  Iris presents today for consultation regarding bilateral lower extremity pain and heaviness  She reports her symptoms have began to worsen over the past month  She recently purchased over-the-counter compression stockings which she reports have been helping with her symptoms  She does report her symptoms are worsened by prolonged standing or increased activity  She does report a family history venous disease and has had 2 pregnancies  She denies swelling or itching of the legs  She denies rest pain, claudication, tissue loss  I recommend conservative management with daily compression, leg elevation, regular exercise/ weight management  She will follow up as needed  The following portions of the patient's history were reviewed and updated as appropriate: allergies, current medications, past family history, past medical history, past social history, past surgical history and problem list     Review of Systems   Constitutional: Negative  HENT: Negative  Eyes: Negative  Respiratory: Negative  Cardiovascular: Negative  Gastrointestinal: Negative  Endocrine: Negative  Genitourinary: Negative  Musculoskeletal: Negative  Skin: Negative  Allergic/Immunologic: Negative  Neurological: Negative  Hematological: Negative  Psychiatric/Behavioral: Negative  I have personally reviewed and made appropriate changes to the ROS that was input by the medical assistant         Objective:      Vitals:    07/27/22 1536   BP: 106/72   BP Location: Left arm   Patient Position: Sitting   Cuff Size: Adult   Pulse: 88   Weight: 63 6 kg (140 lb 3 5 oz)   Height: 5' 1" (1 549 m)       Patient Active Problem List   Diagnosis    Non-seasonal allergic rhinitis due to pollen    BMI 26 0-26 9,adult    Right hand paresthesia    Alopecia    Chronic idiopathic constipation    Viral illness    Annual physical exam    Encounter for screening mammogram for malignant neoplasm of breast    Mixed hyperlipidemia    Hyperglycemia    Other fatigue    Encounter for hepatitis C screening test for low risk patient    Varicose veins of both lower extremities with pain       Past Surgical History:   Procedure Laterality Date    APPENDECTOMY      BODY LIFT LOWER N/A 9/30/2016    Procedure: BODY LIFT Maria Del Carmen Seller BUTT LIFT ;  Surgeon: Herberth Heller MD;  [facility];  Service:     [place] SUCT SERINA LIPECTOMY,UP EXTREM Bilateral 9/30/2016    Procedure: LIPOSUCTION ARM;  Surgeon: Herberth Heller MD;  [facility];  Service: Plastics    WISDOM TOOTH EXTRACTION         Family History   Problem Relation Age of Onset    Coronary artery disease Mother        Social History     Socioeconomic History    Marital status: Single     Spouse name: Not on file    Number of children: Not on file    Years of education: Not on file    Highest education level: Not on file   Occupational History    Not on file   Tobacco Use    Smoking status: Never Smoker    Smokeless tobacco: Never Used   Substance and Sexual Activity    Alcohol use: Yes     Comment: occas    Drug use: No    Sexual activity: Yes     Partners: Male   Other Topics Concern    Not on file   Social History Narrative    Not on file     Social Determinants of Health     Financial Resource Strain: Not on file   Food Insecurity: Not on file   Transportation Needs: Not on file   Physical Activity: Not on file   Stress: Not on file   Social Connections: Not on file   Intimate Partner Violence: Not on file   Housing Stability: Not on file       No Known Allergies      Current Outpatient Medications:     ketoconazole (NIZORAL) 2 % shampoo, PLEASE SEE ATTACHED FOR DETAILED DIRECTIONS, Disp: , Rfl:     phentermine (ADIPEX-P) 37 5 MG tablet, Take 37 5 mg by mouth daily before breakfast, Disp: , Rfl:     topiramate (TOPAMAX) 25 mg tablet, TAKE 1 TABLET BY MOUTH 2 TIMES EVERY DAY IN THE MORNING AND EVENING, Disp: , Rfl:       /72 (BP Location: Left arm, Patient Position: Sitting, Cuff Size: Adult)   Pulse 88   Ht 5' 1" (1 549 m)   Wt 63 6 kg (140 lb 3 5 oz)   BMI 26 49 kg/m²          Physical Exam  Vitals and nursing note reviewed  Constitutional:       Appearance: Normal appearance  HENT:      Head: Normocephalic and atraumatic  Neck:      Vascular: No carotid bruit  Cardiovascular:      Rate and Rhythm: Normal rate and regular rhythm  Pulses:           Radial pulses are 2+ on the right side and 2+ on the left side  Dorsalis pedis pulses are 2+ on the right side and 2+ on the left side  Heart sounds: Normal heart sounds  Comments: No carotid bruit   Pulmonary:      Effort: Pulmonary effort is normal  No respiratory distress  Breath sounds: Normal breath sounds  Abdominal:      General: Bowel sounds are normal  There is no distension  Palpations: Abdomen is soft  Comments: No abdominal bruit or pulsatile masses  Musculoskeletal:         General: No swelling  Normal range of motion  Cervical back: Normal range of motion and neck supple  Right lower leg: No edema  Left lower leg: No edema  Skin:     General: Skin is warm  Capillary Refill: Capillary refill takes less than 2 seconds  Neurological:      General: No focal deficit present  Mental Status: She is alert and oriented to person, place, and time  Psychiatric:         Mood and Affect: Mood normal          Behavior: Behavior normal        Britt Mejia PA-C  [facility]  [phone]    This text is generated with voice recognition software  There may be translation, syntax, or grammatical errors  If have any questions, please contact the dictating provider  Chayo Anderson PA-C  7/27/2022  4:15 PM  Sign when Signing Visit  Assessment/Plan:    Varicose veins of both lower extremities with pain  Patient is a 42-year-old female, never smoker, with no significant past medical history    She presents today for evaluation of bilateral lower extremity pain and heaviness that has been progressing over the last few months  She has been wearing occasional over-the-counter compression stockings which have been helping  She denies edema, pruritis    -No evidence of varicosities on the BLE  There are a few small areas of discoloration on the RLE  -BLE are warm, perfused without edema  Palpable pedal pulses bilaterally  -Denies hx of DVT, phlebitis, or bleeding veins    -We discussed the pathophysiology of varicose veins, current management, indications for surgical intervention   -Recommend conservative management with use of daily compression hose (20-30 mmHg), lower extremity elevation, regular exercise, OTC analgesics for pain, warm compresses, and diligent skin care  - Script for 20-30 mmHg graded compression provided today  - Follow up prn   - Instructed to call the office in the interim with any questions, concerns, or new symptoms  Diagnoses and all orders for this visit:    Varicose veins of both lower extremities with pain  -     Ambulatory Referral to Vascular Surgery  -     Compression Stocking          Subjective:      Patient ID: Minnie Lubin is a 43 y o  female  Patient is new to our practice referred by SALVADOR Moctezuma  Patient c/o varicose veins that are painful and bulging  Patient states she has one small vein on her r leg that looks like a bruise and at times hurts and her legs will feel tire when walking  Patient bought a pair of compression stocking before and she wears them at times if her legs hurts or feels heavy  HPI  Iris presents today for consultation regarding bilateral lower extremity pain and heaviness  She reports her symptoms have began to worsen over the past month  She recently purchased over-the-counter compression stockings which she reports have been helping with her symptoms    She does report her symptoms are worsened by prolonged standing or increased activity  She does report a family history venous disease and has had 2 pregnancies  She denies swelling or itching of the legs  She denies rest pain, claudication, tissue loss  I recommend conservative management with daily compression, leg elevation, regular exercise/ weight management  She will follow up as needed  The following portions of the patient's history were reviewed and updated as appropriate: allergies, current medications, past family history, past medical history, past social history, past surgical history and problem list     Review of Systems   Constitutional: Negative  HENT: Negative  Eyes: Negative  Respiratory: Negative  Cardiovascular: Negative  Gastrointestinal: Negative  Endocrine: Negative  Genitourinary: Negative  Musculoskeletal: Negative  Skin: Negative  Allergic/Immunologic: Negative  Neurological: Negative  Hematological: Negative  Psychiatric/Behavioral: Negative  I have personally reviewed and made appropriate changes to the ROS that was input by the medical assistant         Objective:      Vitals:    07/27/22 1536   BP: 106/72   BP Location: Left arm   Patient Position: Sitting   Cuff Size: Adult   Pulse: 88   Weight: 63 6 kg (140 lb 3 5 oz)   Height: 5' 1" (1 549 m)       Patient Active Problem List   Diagnosis    Non-seasonal allergic rhinitis due to pollen    BMI 26 0-26 9,adult    Right hand paresthesia    Alopecia    Chronic idiopathic constipation    Viral illness    Annual physical exam    Encounter for screening mammogram for malignant neoplasm of breast    Mixed hyperlipidemia    Hyperglycemia    Other fatigue    Encounter for hepatitis C screening test for low risk patient    Varicose veins of both lower extremities with pain       Past Surgical History:   Procedure Laterality Date    APPENDECTOMY      BODY LIFT LOWER N/A 9/30/2016    Procedure: BODY LIFT Mishaer Sandy BUTT LIFT ;  Surgeon: Rolly Ambriz MD; [facility];  Service:     WA SUCT SERINA LIPECTOMY,UP EXTREM Bilateral 9/30/2016    Procedure: LIPOSUCTION ARM;  Surgeon: Kanu Bardales MD;  [facility];  Service: Plastics    WISDOM TOOTH EXTRACTION         Family History   Problem Relation Age of Onset    Coronary artery disease Mother        Social History     Socioeconomic History    Marital status: Single     Spouse name: Not on file    Number of children: Not on file    Years of education: Not on file    Highest education level: Not on file   Occupational History    Not on file   Tobacco Use    Smoking status: Never Smoker    Smokeless tobacco: Never Used   Substance and Sexual Activity    Alcohol use: Yes     Comment: occas    Drug use: No    Sexual activity: Yes     Partners: Male   Other Topics Concern    Not on file   Social History Narrative    Not on file     Social Determinants of Health     Financial Resource Strain: Not on file   Food Insecurity: Not on file   Transportation Needs: Not on file   Physical Activity: Not on file   Stress: Not on file   Social Connections: Not on file   Intimate Partner Violence: Not on file   Housing Stability: Not on file       No Known Allergies      Current Outpatient Medications:     ketoconazole (NIZORAL) 2 % shampoo, PLEASE SEE ATTACHED FOR DETAILED DIRECTIONS, Disp: , Rfl:     phentermine (ADIPEX-P) 37 5 MG tablet, Take 37 5 mg by mouth daily before breakfast, Disp: , Rfl:     topiramate (TOPAMAX) 25 mg tablet, TAKE 1 TABLET BY MOUTH 2 TIMES EVERY DAY IN THE MORNING AND EVENING, Disp: , Rfl:       /72 (BP Location: Left arm, Patient Position: Sitting, Cuff Size: Adult)   Pulse 88   Ht 5' 1" (1 549 m)   Wt 63 6 kg (140 lb 3 5 oz)   BMI 26 49 kg/m²          Physical Exam  Vitals and nursing note reviewed  Constitutional:       Appearance: Normal appearance  HENT:      Head: Normocephalic and atraumatic  Neck:      Vascular: No carotid bruit     Cardiovascular: Rate and Rhythm: Normal rate and regular rhythm  Pulses:           Radial pulses are 2+ on the right side and 2+ on the left side  Dorsalis pedis pulses are 2+ on the right side and 2+ on the left side  Heart sounds: Normal heart sounds  Comments: No carotid bruit   Pulmonary:      Effort: Pulmonary effort is normal  No respiratory distress  Breath sounds: Normal breath sounds  Abdominal:      General: Bowel sounds are normal  There is no distension  Palpations: Abdomen is soft  Comments: No abdominal bruit or pulsatile masses  Musculoskeletal:         General: No swelling  Normal range of motion  Cervical back: Normal range of motion and neck supple  Right lower leg: No edema  Left lower leg: No edema  Skin:     General: Skin is warm  Capillary Refill: Capillary refill takes less than 2 seconds  Neurological:      General: No focal deficit present  Mental Status: She is alert and oriented to person, place, and time  Psychiatric:         Mood and Affect: Mood normal          Behavior: Behavior normal        Vanessa Mcwilliams PA-C  [facility]  [phone]    This text is generated with voice recognition software  There may be translation, syntax, or grammatical errors  If have any questions, please contact the dictating provider

## 2022-07-27 NOTE — PROGRESS NOTES
Assessment/Plan:    Varicose veins of both lower extremities with pain  Patient is a 45-year-old female, never smoker, with no significant past medical history  She presents today for evaluation of bilateral lower extremity pain and heaviness that has been progressing over the last few months  She has been wearing occasional over-the-counter compression stockings which have been helping  She denies edema, pruritis    -No evidence of varicosities on the BLE  There are a few small areas of discoloration on the RLE  -BLE are warm, perfused without edema  Palpable pedal pulses bilaterally  -Denies hx of DVT, phlebitis, or bleeding veins    -We discussed the pathophysiology of varicose veins, current management, indications for surgical intervention   -Recommend conservative management with use of daily compression hose (20-30 mmHg), lower extremity elevation, regular exercise, OTC analgesics for pain, warm compresses, and diligent skin care  - Script for 20-30 mmHg graded compression provided today  - Follow up prn   - Instructed to call the office in the interim with any questions, concerns, or new symptoms  Diagnoses and all orders for this visit:    Varicose veins of both lower extremities with pain  -     Ambulatory Referral to Vascular Surgery  -     Compression Stocking          Subjective:      Patient ID: Donny Alcocer is a 43 y o  female  HPI  Iris presents today for consultation regarding bilateral lower extremity pain and heaviness  She reports her symptoms have began to worsen over the past month  She recently purchased over-the-counter compression stockings which she reports have been helping with her symptoms  She does report her symptoms are worsened by prolonged standing or increased activity  She does report a family history venous disease and has had 2 pregnancies  She denies swelling or itching of the legs  She denies rest pain, claudication, tissue loss   I recommend conservative management with daily compression, leg elevation, regular exercise/ weight management  She will follow up as needed  The following portions of the patient's history were reviewed and updated as appropriate: allergies, current medications, past family history, past medical history, past social history, past surgical history and problem list     Review of Systems   Constitutional: Negative  HENT: Negative  Eyes: Negative  Respiratory: Negative  Cardiovascular: Negative  Gastrointestinal: Negative  Endocrine: Negative  Genitourinary: Negative  Musculoskeletal: Negative  Skin: Negative  Allergic/Immunologic: Negative  Neurological: Negative  Hematological: Negative  Psychiatric/Behavioral: Negative  I have personally reviewed and made appropriate changes to the ROS that was input by the medical assistant         Objective:      Vitals:    07/27/22 1536   BP: 106/72   BP Location: Left arm   Patient Position: Sitting   Cuff Size: Adult   Pulse: 88   Weight: 63 6 kg (140 lb 3 5 oz)   Height: 5' 1" (1 549 m)       Patient Active Problem List   Diagnosis    Non-seasonal allergic rhinitis due to pollen    BMI 26 0-26 9,adult    Right hand paresthesia    Alopecia    Chronic idiopathic constipation    Viral illness    Annual physical exam    Encounter for screening mammogram for malignant neoplasm of breast    Mixed hyperlipidemia    Hyperglycemia    Other fatigue    Encounter for hepatitis C screening test for low risk patient    Varicose veins of both lower extremities with pain       Past Surgical History:   Procedure Laterality Date    APPENDECTOMY      BODY LIFT LOWER N/A 9/30/2016    Procedure: BODY LIFT Felicie Quant BUTT LIFT ;  Surgeon: Jenifer Mcknight MD;  [facility];  Service:     NELLIE SMALLS [address] [place] [address] Bilateral 9/30/2016    Procedure: LIPOSUCTION ARM;  Surgeon: Jenifer Mcknight MD;  [facility];  Service: Plastics    WISDOM TOOTH EXTRACTION         Family History   Problem Relation Age of Onset    Coronary artery disease Mother        Social History     Socioeconomic History    Marital status: Single     Spouse name: Not on file    Number of children: Not on file    Years of education: Not on file    Highest education level: Not on file   Occupational History    Not on file   Tobacco Use    Smoking status: Never Smoker    Smokeless tobacco: Never Used   Substance and Sexual Activity    Alcohol use: Yes     Comment: occas    Drug use: No    Sexual activity: Yes     Partners: Male   Other Topics Concern    Not on file   Social History Narrative    Not on file     Social Determinants of Health     Financial Resource Strain: Not on file   Food Insecurity: Not on file   Transportation Needs: Not on file   Physical Activity: Not on file   Stress: Not on file   Social Connections: Not on file   Intimate Partner Violence: Not on file   Housing Stability: Not on file       No Known Allergies      Current Outpatient Medications:     ketoconazole (NIZORAL) 2 % shampoo, PLEASE SEE ATTACHED FOR DETAILED DIRECTIONS, Disp: , Rfl:     phentermine (ADIPEX-P) 37 5 MG tablet, Take 37 5 mg by mouth daily before breakfast, Disp: , Rfl:     topiramate (TOPAMAX) 25 mg tablet, TAKE 1 TABLET BY MOUTH 2 TIMES EVERY DAY IN THE MORNING AND EVENING, Disp: , Rfl:       /72 (BP Location: Left arm, Patient Position: Sitting, Cuff Size: Adult)   Pulse 88   Ht 5' 1" (1 549 m)   Wt 63 6 kg (140 lb 3 5 oz)   BMI 26 49 kg/m²          Physical Exam  Vitals and nursing note reviewed  Constitutional:       Appearance: Normal appearance  HENT:      Head: Normocephalic and atraumatic  Neck:      Vascular: No carotid bruit  Cardiovascular:      Rate and Rhythm: Normal rate and regular rhythm  Pulses:           Radial pulses are 2+ on the right side and 2+ on the left side          Dorsalis pedis pulses are 2+ on the right side and 2+ on the left side  Heart sounds: Normal heart sounds  Comments: No carotid bruit   Pulmonary:      Effort: Pulmonary effort is normal  No respiratory distress  Breath sounds: Normal breath sounds  Abdominal:      General: Bowel sounds are normal  There is no distension  Palpations: Abdomen is soft  Comments: No abdominal bruit or pulsatile masses  Musculoskeletal:         General: No swelling  Normal range of motion  Cervical back: Normal range of motion and neck supple  Right lower leg: No edema  Left lower leg: No edema  Skin:     General: Skin is warm  Capillary Refill: Capillary refill takes less than 2 seconds  Neurological:      General: No focal deficit present  Mental Status: She is alert and oriented to person, place, and time  Psychiatric:         Mood and Affect: Mood normal          Behavior: Behavior normal        Diandra Mcgrath PA-C  [facility]  [phone]    This text is generated with voice recognition software  There may be translation, syntax, or grammatical errors  If have any questions, please contact the dictating provider

## 2022-07-27 NOTE — PATIENT INSTRUCTIONS
- I recommend daily compression stockings (20-30 mmHg), on in am and off in pm, frequent leg elevation, and regular exercise    -I gave you a script for compression stockings today, please go to a medical supply store to acquire    -Please call with questions, concerns, or new symptoms  Venous Insufficiency   AMBULATORY CARE:   Venous insufficiency  is a condition that prevents blood from flowing out of your legs and back to your heart  Veins contain valves that help blood flow in one direction  Venous insufficiency means the valves do not close correctly or fully  Blood flows back and pools in your leg  This can cause problems such as varicose veins  Venous insufficiency may also be called chronic venous insufficiency or venous stasis  Common signs and symptoms:   Visible veins on your legs that may be small and red or large, thick, and blue         Swelling in your ankles or calves         Changes in skin color, such as dark or purple skin    An ulcer (open sore) on your leg    Leg pain that is worse when you are menstruating (women) or when you stand, and better when you elevate your legs    Burning or itching    Cramps that happen at night    Thick, hard skin on your legs and ankles    Feeling of heaviness in your legs    Seek care immediately if:   You have a wound that does not heal or is infected  You have an injury that has broken your skin and caused your varicose veins to bleed  Your leg is swollen and hard  You have pain in your leg that does not go away or gets worse  Your legs or feet are turning blue or black  Your leg feels warm, tender, and painful  It may look swollen and red  Call your doctor if:   You have a fever  You have varicose veins and they are painful  You have new or worsening leg pain, swelling, or redness  You have new or worsening ulcers or other sores on your leg  You have questions or concerns about your condition or care      Treatment  may include any of the following:  Medicine  may be given to improve blood flow  The medicines may thin your blood or reduce swelling to help blood flow  You may also need medicine to treat a bacterial infection  Ablation  is a procedure used to close varicose veins  A catheter is guided until it is near the vein  A device will then be guided to the area  The device may produce energy through radiofrequency or a laser  The energy creates heat that will close the blood vessel  Sclerotherapy  is a procedure used to fade visible veins  Your healthcare provider will inject a liquid into a spider vein or varicose vein  The liquid causes irritation in the vein  The vein swells and sticks together  Your body will then absorb the vein  Surgery  may be needed if other treatments do not work  Surgery may be used to repair a leg vein valve or to clip or tie off a vein so blood cannot flow through it  You may need to have a veins removed during surgery called stripping  Surgery may be used to bypass (go around) the damaged vein  Blood will flow through a vein transplanted from another part of your body  Manage your symptoms:   Wear pressure stockings as directed  Pressure stockings help keep blood from pooling in your leg veins  Your healthcare provider can prescribe stockings that are right for you  Do not buy over-the-counter pressure stockings unless your healthcare provider says it is okay  They may not fit correctly or may have elastic that cuts off your circulation  Ask your healthcare provider when to start wearing pressure stockings and how long to wear them each day  Do not sit or stand for long periods of time  If you have to sit for a long time, flex and extend your legs, feet, and ankles  Do this about 10 times every 30 minutes to help keep blood flowing  If you have to stand for a long time, take breaks and sit with your legs elevated  Elevate your legs    Elevate your legs above the level of your heart to reduce swelling  Your healthcare provider may recommend that you keep your legs elevated for 30 minutes at a time  You may need to do this 3 to 4 times per day, or more if your healthcare provider recommends  Do not smoke  Nicotine and other chemicals in cigarettes and cigars can cause blood vessel damage  Ask your healthcare provider for information if you currently smoke and need help to quit  E-cigarettes or smokeless tobacco still contain nicotine  Talk to your healthcare provider before you use these products  Reach or maintain a healthy weight  Extra weight can make venous insufficiency worse  Ask your healthcare provider what a healthy weight is for you  He or she can help you create a weight loss plan if you need to lose weight  Exercise as directed  Walking can help increase blood flow in your calves  Ask your healthcare provider how much exercise you need each day and which exercises are best for you  Care for your skin  Keep your skin clean  Do not use any soaps or lotions that may dry your skin  For example, do not use products that contain fragrance or alcohol  If you have a skin ulcer, your healthcare provider may recommend a wet-to-dry bandage  To do this, apply a wet bandage to your wound and allow it to dry  This will help remove drainage from your wound each time you change the bandage  Your healthcare provider will tell you how often to change your bandage and which kind of bandage to use  Check your wound for signs of infection, such as swelling or pus  Go to physical therapy (PT) as directed  A physical therapist can help you increase movement and range of motion in your legs  Follow up with your doctor as directed:  Write down your questions so you remember to ask them during your visits  © Copyright [employer] 2022 Information is for End User's use only and may not be sold, redistributed or otherwise used for commercial purposes   All illustrations and images included in CareNotes® are the copyrighted property of A D A M , Inc  or Becka Galvan   The above information is an  only  It is not intended as medical advice for individual conditions or treatments  Talk to your doctor, nurse or pharmacist before following any medical regimen to see if it is safe and effective for you

## 2022-07-27 NOTE — ASSESSMENT & PLAN NOTE
Patient is a 59-year-old female, never smoker, with no significant past medical history  She presents today for evaluation of bilateral lower extremity pain and heaviness that has been progressing over the last few months  She has been wearing occasional over-the-counter compression stockings which have been helping   -No evidence of varicosities on the BLE  There are a few small areas of discoloration on the RLE  -BLE are warm, perfused without edema  Palpable pedal pulses bilaterally    -We discussed the pathophysiology of varicose veins, current management, indications for surgical intervention   -Recommend conservative management with use of daily compression hose (20-30 mmHg), lower extremity elevation, regular exercise, and diligent skin care  - Script for 20-30 mmHg graded compression provided today  - Follow up prn   - Instructed to call the office in the interim with any questions, concerns, or new symptoms

## 2023-06-22 ENCOUNTER — RA CDI HCC (OUTPATIENT)
Dept: OTHER | Facility: HOSPITAL | Age: 43
End: 2023-06-22

## 2023-07-10 ENCOUNTER — OFFICE VISIT (OUTPATIENT)
Dept: FAMILY MEDICINE CLINIC | Facility: CLINIC | Age: 43
End: 2023-07-10
Payer: COMMERCIAL

## 2023-07-10 VITALS
HEIGHT: 61 IN | SYSTOLIC BLOOD PRESSURE: 122 MMHG | OXYGEN SATURATION: 95 % | TEMPERATURE: 97 F | DIASTOLIC BLOOD PRESSURE: 75 MMHG | RESPIRATION RATE: 17 BRPM | WEIGHT: 142.8 LBS | HEART RATE: 82 BPM | BODY MASS INDEX: 26.96 KG/M2

## 2023-07-10 DIAGNOSIS — Z23 NEED FOR TDAP VACCINATION: ICD-10-CM

## 2023-07-10 DIAGNOSIS — L65.9 ALOPECIA: ICD-10-CM

## 2023-07-10 DIAGNOSIS — Z00.00 ANNUAL PHYSICAL EXAM: Primary | ICD-10-CM

## 2023-07-10 DIAGNOSIS — B36.0 TINEA VERSICOLOR: ICD-10-CM

## 2023-07-10 DIAGNOSIS — M25.552 LEFT HIP PAIN: ICD-10-CM

## 2023-07-10 DIAGNOSIS — Z11.59 ENCOUNTER FOR HEPATITIS C SCREENING TEST FOR LOW RISK PATIENT: ICD-10-CM

## 2023-07-10 DIAGNOSIS — Z13.6 SCREENING FOR CARDIOVASCULAR CONDITION: ICD-10-CM

## 2023-07-10 PROBLEM — R20.2 RIGHT HAND PARESTHESIA: Status: RESOLVED | Noted: 2021-04-16 | Resolved: 2023-07-10

## 2023-07-10 PROBLEM — K59.04 CHRONIC IDIOPATHIC CONSTIPATION: Status: RESOLVED | Noted: 2021-04-16 | Resolved: 2023-07-10

## 2023-07-10 PROBLEM — Z12.31 ENCOUNTER FOR SCREENING MAMMOGRAM FOR MALIGNANT NEOPLASM OF BREAST: Status: RESOLVED | Noted: 2022-06-09 | Resolved: 2023-07-10

## 2023-07-10 PROBLEM — B34.9 VIRAL ILLNESS: Status: RESOLVED | Noted: 2022-01-13 | Resolved: 2023-07-10

## 2023-07-10 PROCEDURE — 99396 PREV VISIT EST AGE 40-64: CPT | Performed by: PHYSICIAN ASSISTANT

## 2023-07-10 PROCEDURE — 90715 TDAP VACCINE 7 YRS/> IM: CPT

## 2023-07-10 PROCEDURE — 90471 IMMUNIZATION ADMIN: CPT

## 2023-07-10 RX ORDER — SPIRONOLACTONE 50 MG/1
50 TABLET, FILM COATED ORAL DAILY
COMMUNITY

## 2023-07-10 RX ORDER — KETOCONAZOLE 20 MG/G
CREAM TOPICAL DAILY
Qty: 15 G | Refills: 0 | Status: SHIPPED | OUTPATIENT
Start: 2023-07-10

## 2023-07-10 RX ORDER — NORGESTIMATE AND ETHINYL ESTRADIOL 0.25-0.035
1 KIT ORAL DAILY
COMMUNITY
Start: 2023-05-03 | End: 2023-07-10 | Stop reason: ALTCHOICE

## 2023-07-10 RX ORDER — SPIRONOLACTONE 50 MG/1
TABLET, FILM COATED ORAL
COMMUNITY
Start: 2023-07-08 | End: 2023-07-10 | Stop reason: ALTCHOICE

## 2023-07-10 NOTE — PROGRESS NOTES
ADULT ANNUAL [address] [facility] PRIMARY CARE [facility]    NAME: Johanna Market  AGE: 37 y.o. SEX: female  : 1980     DATE: 7/10/2023     Assessment and Plan:     Problem List Items Addressed This Visit        Musculoskeletal and Integument    Alopecia     Following with [facility], due for labs per derm. Patient not sure what medication she is taking and why, on spironolactone? Relevant Medications    ketoconazole (NIZORAL) 2 % cream    Other Relevant Orders    TSH, 3rd generation with Free T4 reflex       Other    BMI 26.0-26.9,adult     Previously on weight loss medications from GYN, continue with diet/exercise. BMI Counseling: Body mass index is 26.98 kg/m². The BMI is above normal. Nutrition recommendations include reducing portion sizes, decreasing overall calorie intake, 3-5 servings of fruits/vegetables daily, reducing fast food intake and consuming healthier snacks. Exercise recommendations include exercising 3-5 times per week and strength training exercises. Left hip pain     L hip pain. Patient concerned due to family history of arthritis, suspect trochanteric bursitis x 1 month of pain since cruise wearing wedges daily, recommend exercises, check labs due to patient request, follow-up as needed, consider PT. Not requiring analgesics.           Relevant Orders    C-reactive protein    RF Screen w/ Reflex to Titer    Cyclic citrul peptide antibody, IgG   Other Visit Diagnoses     Annual physical exam    -  Primary    Tinea versicolor        present posterior low back, recommend ketoconazole shampoo as per derm      Relevant Medications    ketoconazole (NIZORAL) 2 % cream    Need for Tdap vaccination        Relevant Orders    TDAP VACCINE GREATER THAN OR EQUAL TO 8YO IM (Completed)    Encounter for hepatitis C screening test for low risk patient        Relevant Orders    Hepatitis C antibody    Screening for cardiovascular condition        Relevant Orders    Lipid panel    Comprehensive metabolic panel    CBC and differential          Immunizations and preventive care screenings were discussed with patient today. Appropriate education was printed on patient's after visit summary. Counseling:  Alcohol/drug use: discussed moderation in alcohol intake, the recommendations for healthy alcohol use, and avoidance of illicit drug use. Dental Health: discussed importance of regular tooth brushing, flossing, and dental visits. Injury prevention: discussed safety/seat belts, safety helmets, smoke detectors, carbon dioxide detectors, and smoking near bedding or upholstery. Sexual health: discussed sexually transmitted diseases, partner selection, use of condoms, avoidance of unintended pregnancy, and contraceptive alternatives. Exercise: the importance of regular exercise/physical activity was discussed. Recommend exercise 3-5 times per week for at least 30 minutes. Return in about 1 year (around 7/10/2024) for Annual physical.     Chief Complaint:     Chief Complaint   Patient presents with   • Physical Exam      History of Present Illness:     Adult Annual Physical   Patient here for a comprehensive physical exam. The patient reports problems - hair loss x years. Dermatology - privated, dermatology partner. Working as . 2 grandkids, helping to babysit and take care of them which she enjoys. L hip bothering, wanted an appointment due to family history of arthritis, previously sleeping on that side, can't sleep on that side, cross her legs when sit and it bothers her x 1 month, getting better. Work is making them stretch before. Had similar white spots when lived in [place], not bothering her, sometimes along her bikini line as well, following with derm.      No smoking, no alcohol besides rare social.     Sees gynecology - Dr. Xena Rivas annually for pap, up to date, mammogram in 9/2022 has script for repeat    Diet and Physical Activity  Diet/Nutrition: well balanced diet. Exercise: no formal exercise. Depression Screening  PHQ-2/9 Depression Screening    Little interest or pleasure in doing things: 0 - not at all  Feeling down, depressed, or hopeless: 0 - not at all  PHQ-2 Score: 0  PHQ-2 Interpretation: Negative depression screen       General Health  Sleep: sleeps well. Hearing: normal - bilateral.  Vision: no vision problems and wears glasses. Dental: regular dental visits and brushes teeth twice daily. /GYN Health  Patient is: premenopausal  Last menstrual period: 6/21/23  Contraceptive method: IUD placement. Review of Systems:     Review of Systems   Constitutional: Negative for chills and fever. HENT: Negative for ear pain and sore throat. Eyes: Negative for pain and visual disturbance. Respiratory: Negative for cough and shortness of breath. Cardiovascular: Negative for chest pain and palpitations. Gastrointestinal: Negative for abdominal pain and vomiting. Genitourinary: Negative for dysuria and hematuria. Musculoskeletal: Positive for arthralgias (L hip). Negative for back pain. Skin: Negative for color change and rash. Neurological: Negative for seizures and syncope. All other systems reviewed and are negative.      Past Medical History:     Past Medical History:   Diagnosis Date   • Right hand paresthesia 4/16/2021      Past Surgical History:     Past Surgical History:   Procedure Laterality Date   • APPENDECTOMY     • BODY LIFT LOWER N/A 9/30/2016    Procedure: BODY LIFT [place] BUTT LIFT ;  Surgeon: Socorro Olszewski, MD;  [facility];  Service:    • [place] SUCTION ASSISTED LIPECTOMY UPPER EXTREMITY Bilateral 9/30/2016    Procedure: LIPOSUCTION ARM;  Surgeon: Socorro Olszewski, MD;  [facility];  Service: Plastics   • WISDOM TOOTH EXTRACTION        Social History:     Social History     Socioeconomic History   • Marital status: Single     Spouse name: None   • Number of children: None   • Years of education: None   • Highest education level: None   Occupational History   • None   Tobacco Use   • Smoking status: Never   • Smokeless tobacco: Never   Substance and Sexual Activity   • Alcohol use: Yes     Comment: occas   • Drug use: No   • Sexual activity: Yes     Partners: Male   Other Topics Concern   • None   Social History Narrative   • None     Social Determinants of Health     Financial Resource Strain: Not on file   Food Insecurity: Not on file   Transportation Needs: Not on file   Physical Activity: Not on file   Stress: Not on file   Social Connections: Not on file   Intimate Partner Violence: Not on file   Housing Stability: Not on file      Family History:     Family History   Problem Relation Age of Onset   • Coronary artery disease Mother       Current Medications:     Current Outpatient Medications   Medication Sig Dispense Refill   • ketoconazole (NIZORAL) 2 % cream Apply topically daily At night x 2 weeks 15 g 0   • ketoconazole (NIZORAL) 2 % shampoo PLEASE SEE ATTACHED FOR DETAILED DIRECTIONS     • spironolactone (ALDACTONE) 50 mg tablet Take 50 mg by mouth daily       No current facility-administered medications for this visit. Allergies:     No Known Allergies   Physical Exam:     /75 (BP Location: Left arm, Patient Position: Sitting, Cuff Size: Standard)   Pulse 82   Temp (!) 97 °F (36.1 °C) (Tympanic)   Resp 17   Ht 5' 1" (1.549 m)   Wt 64.8 kg (142 lb 12.8 oz)   SpO2 95%   BMI 26.98 kg/m²     Physical Exam  Vitals and nursing note reviewed. Constitutional:       General: She is not in acute distress. Appearance: She is well-developed. HENT:      Head: Normocephalic and atraumatic.       Right Ear: Tympanic membrane, ear canal and external ear normal.      Left Ear: Tympanic membrane, ear canal and external ear normal.      Mouth/Throat:      Mouth: Mucous membranes are moist.   Eyes:      Extraocular Movements: Extraocular movements intact. Conjunctiva/sclera: Conjunctivae normal.      Pupils: Pupils are equal, round, and reactive to light. Cardiovascular:      Rate and Rhythm: Normal rate and regular rhythm. Heart sounds: No murmur heard. Pulmonary:      Effort: Pulmonary effort is normal. No respiratory distress. Breath sounds: Normal breath sounds. Abdominal:      Palpations: Abdomen is soft. Tenderness: There is no abdominal tenderness. Musculoskeletal:         General: No swelling. Cervical back: Neck supple. Comments: Negative MALENA/FADIR b/l  5/5 strength  Point tenderness at trochanteric bursa L hip   Skin:     General: Skin is warm and dry. Capillary Refill: Capillary refill takes less than 2 seconds. Neurological:      Mental Status: She is alert.    Psychiatric:         Mood and Affect: Mood normal.          Wellington Kapoor PA-C  [address]

## 2023-07-10 NOTE — ASSESSMENT & PLAN NOTE
Following with [facility], due for labs per derm. Patient not sure what medication she is taking and why, on spironolactone?

## 2023-07-10 NOTE — ASSESSMENT & PLAN NOTE
L hip pain. Patient concerned due to family history of arthritis, suspect trochanteric bursitis x 1 month of pain since cruise wearing wedges daily, recommend exercises, check labs due to patient request, follow-up as needed, consider PT. Not requiring analgesics.

## 2023-07-10 NOTE — ASSESSMENT & PLAN NOTE
Previously on weight loss medications from GYN, continue with diet/exercise. BMI Counseling: Body mass index is 26.98 kg/m². The BMI is above normal. Nutrition recommendations include reducing portion sizes, decreasing overall calorie intake, 3-5 servings of fruits/vegetables daily, reducing fast food intake and consuming healthier snacks. Exercise recommendations include exercising 3-5 times per week and strength training exercises.

## 2023-07-27 DIAGNOSIS — B36.0 TINEA VERSICOLOR: ICD-10-CM

## 2023-07-28 RX ORDER — KETOCONAZOLE 20 MG/G
CREAM TOPICAL DAILY
Qty: 15 G | Refills: 0 | Status: SHIPPED | OUTPATIENT
Start: 2023-07-28

## 2023-07-30 ENCOUNTER — LAB (OUTPATIENT)
Dept: LAB | Facility: HOSPITAL | Age: 43
End: 2023-07-30
Payer: COMMERCIAL

## 2023-07-30 DIAGNOSIS — L65.9 ALOPECIA: ICD-10-CM

## 2023-07-30 DIAGNOSIS — M25.552 LEFT HIP PAIN: ICD-10-CM

## 2023-07-30 DIAGNOSIS — Z11.59 ENCOUNTER FOR HEPATITIS C SCREENING TEST FOR LOW RISK PATIENT: ICD-10-CM

## 2023-07-30 DIAGNOSIS — Z13.6 SCREENING FOR CARDIOVASCULAR CONDITION: ICD-10-CM

## 2023-07-30 LAB
ALBUMIN SERPL BCP-MCNC: 4.1 G/DL (ref 3.5–5)
ALP SERPL-CCNC: 56 U/L (ref 34–104)
ALT SERPL W P-5'-P-CCNC: 12 U/L (ref 7–52)
ANION GAP SERPL CALCULATED.3IONS-SCNC: 5 MMOL/L
AST SERPL W P-5'-P-CCNC: 12 U/L (ref 13–39)
BASOPHILS # BLD AUTO: 0.05 THOUSANDS/ÂΜL (ref 0–0.1)
BASOPHILS NFR BLD AUTO: 1 % (ref 0–1)
BILIRUB SERPL-MCNC: 0.43 MG/DL (ref 0.2–1)
BUN SERPL-MCNC: 13 MG/DL (ref 5–25)
CALCIUM SERPL-MCNC: 8.8 MG/DL (ref 8.4–10.2)
CHLORIDE SERPL-SCNC: 104 MMOL/L (ref 96–108)
CHOLEST SERPL-MCNC: 163 MG/DL
CO2 SERPL-SCNC: 28 MMOL/L (ref 21–32)
CREAT SERPL-MCNC: 0.72 MG/DL (ref 0.6–1.3)
CRP SERPL QL: 1.2 MG/L
EOSINOPHIL # BLD AUTO: 0.12 THOUSAND/ÂΜL (ref 0–0.61)
EOSINOPHIL NFR BLD AUTO: 2 % (ref 0–6)
ERYTHROCYTE [DISTWIDTH] IN BLOOD BY AUTOMATED COUNT: 12.7 % (ref 11.6–15.1)
GFR SERPL CREATININE-BSD FRML MDRD: 102 ML/MIN/1.73SQ M
GLUCOSE P FAST SERPL-MCNC: 90 MG/DL (ref 65–99)
HCT VFR BLD AUTO: 39.4 % (ref 34.8–46.1)
HDLC SERPL-MCNC: 58 MG/DL
HGB BLD-MCNC: 12.7 G/DL (ref 11.5–15.4)
IMM GRANULOCYTES # BLD AUTO: 0.02 THOUSAND/UL (ref 0–0.2)
IMM GRANULOCYTES NFR BLD AUTO: 0 % (ref 0–2)
LDLC SERPL CALC-MCNC: 81 MG/DL (ref 0–100)
LYMPHOCYTES # BLD AUTO: 2.32 THOUSANDS/ÂΜL (ref 0.6–4.47)
LYMPHOCYTES NFR BLD AUTO: 32 % (ref 14–44)
MCH RBC QN AUTO: 29.7 PG (ref 26.8–34.3)
MCHC RBC AUTO-ENTMCNC: 32.2 G/DL (ref 31.4–37.4)
MCV RBC AUTO: 92 FL (ref 82–98)
MONOCYTES # BLD AUTO: 0.43 THOUSAND/ÂΜL (ref 0.17–1.22)
MONOCYTES NFR BLD AUTO: 6 % (ref 4–12)
NEUTROPHILS # BLD AUTO: 4.29 THOUSANDS/ÂΜL (ref 1.85–7.62)
NEUTS SEG NFR BLD AUTO: 59 % (ref 43–75)
NONHDLC SERPL-MCNC: 105 MG/DL
NRBC BLD AUTO-RTO: 0 /100 WBCS
PLATELET # BLD AUTO: 245 THOUSANDS/UL (ref 149–390)
PMV BLD AUTO: 9.5 FL (ref 8.9–12.7)
POTASSIUM SERPL-SCNC: 4.3 MMOL/L (ref 3.5–5.3)
PROT SERPL-MCNC: 7 G/DL (ref 6.4–8.4)
RBC # BLD AUTO: 4.28 MILLION/UL (ref 3.81–5.12)
SODIUM SERPL-SCNC: 137 MMOL/L (ref 135–147)
TRIGL SERPL-MCNC: 122 MG/DL
TSH SERPL DL<=0.05 MIU/L-ACNC: 1.32 UIU/ML (ref 0.45–4.5)
WBC # BLD AUTO: 7.23 THOUSAND/UL (ref 4.31–10.16)

## 2023-07-30 PROCEDURE — 86803 HEPATITIS C AB TEST: CPT

## 2023-07-30 PROCEDURE — 86430 RHEUMATOID FACTOR TEST QUAL: CPT

## 2023-07-30 PROCEDURE — 85025 COMPLETE CBC W/AUTO DIFF WBC: CPT

## 2023-07-30 PROCEDURE — 86200 CCP ANTIBODY: CPT

## 2023-07-30 PROCEDURE — 80053 COMPREHEN METABOLIC PANEL: CPT

## 2023-07-30 PROCEDURE — 80061 LIPID PANEL: CPT

## 2023-07-30 PROCEDURE — 84443 ASSAY THYROID STIM HORMONE: CPT

## 2023-07-30 PROCEDURE — 36415 COLL VENOUS BLD VENIPUNCTURE: CPT

## 2023-07-30 PROCEDURE — 86140 C-REACTIVE PROTEIN: CPT

## 2023-07-31 LAB
HCV AB SER QL: NORMAL
RHEUMATOID FACT SER QL LA: NEGATIVE

## 2023-08-01 LAB — CCP AB SER IA-ACNC: 1.2

## 2023-08-19 ENCOUNTER — APPOINTMENT (OUTPATIENT)
Dept: LAB | Facility: HOSPITAL | Age: 43
End: 2023-08-19
Payer: COMMERCIAL

## 2023-08-19 DIAGNOSIS — L81.1 CHLOASMA: ICD-10-CM

## 2023-08-19 LAB
FERRITIN SERPL-MCNC: 22 NG/ML (ref 11–307)
IRON SATN MFR SERPL: 22 % (ref 15–50)
IRON SERPL-MCNC: 99 UG/DL (ref 50–170)
T4 FREE SERPL-MCNC: 0.78 NG/DL (ref 0.61–1.12)
TIBC SERPL-MCNC: 458 UG/DL (ref 250–450)
TSH SERPL DL<=0.05 MIU/L-ACNC: 0.84 UIU/ML (ref 0.45–4.5)
VIT B12 SERPL-MCNC: 628 PG/ML (ref 180–914)

## 2023-08-19 PROCEDURE — 84439 ASSAY OF FREE THYROXINE: CPT

## 2023-08-19 PROCEDURE — 82306 VITAMIN D 25 HYDROXY: CPT

## 2023-08-19 PROCEDURE — 83550 IRON BINDING TEST: CPT

## 2023-08-19 PROCEDURE — 84443 ASSAY THYROID STIM HORMONE: CPT

## 2023-08-19 PROCEDURE — 83540 ASSAY OF IRON: CPT

## 2023-08-19 PROCEDURE — 82607 VITAMIN B-12: CPT

## 2023-08-19 PROCEDURE — 36415 COLL VENOUS BLD VENIPUNCTURE: CPT

## 2023-08-19 PROCEDURE — 82728 ASSAY OF FERRITIN: CPT

## 2023-08-30 LAB
25(OH)D2 SERPL-MCNC: <1 NG/ML
25(OH)D3 SERPL-MCNC: 31 NG/ML
25(OH)D3+25(OH)D2 SERPL-MCNC: 31 NG/ML

## 2023-12-28 ENCOUNTER — OFFICE VISIT (OUTPATIENT)
Dept: FAMILY MEDICINE CLINIC | Facility: CLINIC | Age: 43
End: 2023-12-28
Payer: COMMERCIAL

## 2023-12-28 VITALS
TEMPERATURE: 96.1 F | SYSTOLIC BLOOD PRESSURE: 125 MMHG | WEIGHT: 139.4 LBS | DIASTOLIC BLOOD PRESSURE: 82 MMHG | OXYGEN SATURATION: 99 % | HEIGHT: 61 IN | BODY MASS INDEX: 26.32 KG/M2 | RESPIRATION RATE: 17 BRPM | HEART RATE: 79 BPM

## 2023-12-28 DIAGNOSIS — Z12.31 SCREENING MAMMOGRAM FOR BREAST CANCER: ICD-10-CM

## 2023-12-28 DIAGNOSIS — J06.9 VIRAL UPPER RESPIRATORY TRACT INFECTION: Primary | ICD-10-CM

## 2023-12-28 DIAGNOSIS — R05.1 ACUTE COUGH: ICD-10-CM

## 2023-12-28 PROCEDURE — 87635 SARS-COV-2 COVID-19 AMP PRB: CPT | Performed by: PHYSICIAN ASSISTANT

## 2023-12-28 PROCEDURE — 99214 OFFICE O/P EST MOD 30 MIN: CPT | Performed by: PHYSICIAN ASSISTANT

## 2023-12-28 RX ORDER — DEXTROMETHORPHAN HYDROBROMIDE AND PROMETHAZINE HYDROCHLORIDE 15; 6.25 MG/5ML; MG/5ML
5 SYRUP ORAL 4 TIMES DAILY PRN
Qty: 240 ML | Refills: 0 | Status: SHIPPED | OUTPATIENT
Start: 2023-12-28

## 2023-12-28 NOTE — PROGRESS NOTES
Name: Kristi Mayberry      : 1980      MRN: [id number]  Encounter Provider: Carli Brannon PA-C  Encounter Date: 2023   Encounter department: [facility] PRIMARY CARE [facility]    Assessment & Plan     1. Viral upper respiratory tract infection  Comments:  day 6 of symptoms, improving with dayquil/nyquil, home remedies, swab COVID/flu for work, continue conservative management  Orders:  -     promethazine-dextromethorphan (PHENERGAN-DM) 6.25-15 mg/5 mL oral syrup; Take 5 mL by mouth 4 (four) times a day as needed for cough  -     COVID Only- Office Collect    2. Acute cough  Comments:  due to acute URI, start prometh/DM as needed    3. Screening mammogram for breast cancer  -     Mammo screening bilateral w 3d & cad; Future; Expected date: 2023    4. BMI 26.0-26.9,adult  Assessment & Plan:  No longer on phentermine or topiramate for weight mgmt. Continue with diet/exercise.            Subjective      Kristi is a 43 y.o. female who presents with URI symptoms since the day before Kramer. Taking tylenol for flu and nyquil which caused her to sleep in and miss appointment this morning. Has some congestion but getting better with Angelo drink home remedy with onions.Yesterday diarrhea, no longer today. LMP was first week of November. Follows with [facility] for mammo and pap.     Missed work this week and needs note to return.           Review of Systems   Constitutional:  Negative for chills and fever (resolved).   HENT:  Positive for congestion. Negative for postnasal drip, rhinorrhea, sinus pressure, sinus pain, sore throat and trouble swallowing.    Eyes:  Negative for pain, discharge, redness and itching.   Respiratory:  Positive for cough. Negative for shortness of breath and wheezing.    Cardiovascular:  Negative for chest pain and palpitations.   Gastrointestinal:  Negative for abdominal pain.   Neurological:  Negative for headaches.       Current Outpatient Medications on File  "Prior to Visit   Medication Sig   • ketoconazole (NIZORAL) 2 % cream APPLY TOPICALLY DAILY AT NIGHT X 2 WEEKS   • ketoconazole (NIZORAL) 2 % shampoo PLEASE SEE ATTACHED FOR DETAILED DIRECTIONS   • spironolactone (ALDACTONE) 50 mg tablet Take 50 mg by mouth daily   • [DISCONTINUED] phentermine (ADIPEX-P) 37.5 MG tablet TAKE 1 TAB BY MOUTH EVERY DAY BEFORE BREAKFAST AND 1/2 TABLET AT LUNCH   • [DISCONTINUED] topiramate (TOPAMAX) 25 mg tablet        Objective     /82 (BP Location: Left arm, Patient Position: Sitting, Cuff Size: Standard)   Pulse 79   Temp (!) 96.1 °F (35.6 °C) (Tympanic)   Resp 17   Ht 5' 1\" (1.549 m)   Wt 63.2 kg (139 lb 6.4 oz)   SpO2 99%   BMI 26.34 kg/m²     Physical Exam  Vitals and nursing note reviewed.   Constitutional:       Appearance: Normal appearance. She is normal weight.   HENT:      Head: Normocephalic and atraumatic.      Right Ear: Tympanic membrane, ear canal and external ear normal.      Left Ear: Tympanic membrane, ear canal and external ear normal.      Nose: Nose normal.      Mouth/Throat:      Mouth: Mucous membranes are moist.   Eyes:      Extraocular Movements: Extraocular movements intact.      Conjunctiva/sclera: Conjunctivae normal.      Pupils: Pupils are equal, round, and reactive to light.   Cardiovascular:      Rate and Rhythm: Normal rate and regular rhythm.      Pulses: Normal pulses.      Heart sounds: Normal heart sounds.   Pulmonary:      Effort: Pulmonary effort is normal.      Breath sounds: Normal breath sounds.   Musculoskeletal:      Cervical back: Normal range of motion.   Lymphadenopathy:      Cervical: No cervical adenopathy.   Neurological:      Mental Status: She is alert and oriented to person, place, and time. Mental status is at baseline.       Carli Brannon PA-C    "

## 2023-12-28 NOTE — LETTER
December 28, 2023     Patient: Kristi Mayberry  YOB: 1980  Date of Visit: 12/28/2023      To Whom it May Concern:    Kristi Mayberry is under my professional care. Kristi was seen in my office on 12/28/2023. Kristi may return to work on Tuesday, January 2, 2024 . Please excuse her from work starting 12/26/23.     If you have any questions or concerns, please don't hesitate to call.         Sincerely,          Carli Brannon PA-C        CC: No Recipients

## 2023-12-29 LAB — SARS-COV-2 RNA RESP QL NAA+PROBE: POSITIVE

## 2024-05-01 ENCOUNTER — OFFICE VISIT (OUTPATIENT)
Dept: FAMILY MEDICINE CLINIC | Facility: CLINIC | Age: 44
End: 2024-05-01
Payer: COMMERCIAL

## 2024-05-01 VITALS
OXYGEN SATURATION: 98 % | DIASTOLIC BLOOD PRESSURE: 90 MMHG | HEART RATE: 102 BPM | TEMPERATURE: 96.8 F | WEIGHT: 143.4 LBS | BODY MASS INDEX: 27.08 KG/M2 | HEIGHT: 61 IN | SYSTOLIC BLOOD PRESSURE: 136 MMHG | RESPIRATION RATE: 16 BRPM

## 2024-05-01 DIAGNOSIS — R03.0 ELEVATED BP WITHOUT DIAGNOSIS OF HYPERTENSION: Primary | ICD-10-CM

## 2024-05-01 DIAGNOSIS — E66.3 OVERWEIGHT (BMI 25.0-29.9): ICD-10-CM

## 2024-05-01 DIAGNOSIS — L65.9 ALOPECIA: ICD-10-CM

## 2024-05-01 PROCEDURE — 3725F SCREEN DEPRESSION PERFORMED: CPT | Performed by: PHYSICIAN ASSISTANT

## 2024-05-01 PROCEDURE — 99214 OFFICE O/P EST MOD 30 MIN: CPT | Performed by: PHYSICIAN ASSISTANT

## 2024-05-01 RX ORDER — PHENTERMINE HYDROCHLORIDE 37.5 MG/1
TABLET ORAL
COMMUNITY
Start: 2024-03-04 | End: 2024-05-01 | Stop reason: ALTCHOICE

## 2024-05-01 NOTE — PROGRESS NOTES
Name: Kristi Mayberry      : 1980      MRN: [id number]  Encounter Provider: Carli Brannon PA-C  Encounter Date: 2024   Encounter department: [facility] CARE [facility]    Assessment & Plan     1. Elevated BP without diagnosis of hypertension  Assessment & Plan:  Patient had elevated blood pressure at GYN office last fall, does not member how high.  Has a family history of high blood pressure.  Will request records from GYN office visit.  Went to [facility] yesterday and had /91 with machine.  In office today ranging high 130s/low 90s.  Suspect phentermine contributing to high blood pressure.  Takes intermittently when she gains weight.  Advised against taking phentermine and recommend DASH diet, low-salt diet, and exercise.  Follow-up as scheduled in July for blood pressure check.      2. Alopecia  Assessment & Plan:  Following [facility] and taking spironolactone.      3. Overweight (BMI 25.0-29.9)  Assessment & Plan:  4 pound weight gain since last visit.  Previously on and off phentermine and topiramate for weight management.  Recommend diet and exercise for weight instead.  Weight fluctuating.             Subjective      Kristi is a 43 y.o. female who presents as a same day patient with concerns for high BP. Asymptomatic. Yesterday blood pressure was high, 147/91 at [facility] checked with automatic machine because her  was also checking.  Had GYN appointment last  with Dr. Bowling and had slight high blood pressure that improved with recheck.  Has been taking phentermine on and off for weight management.  No smoking or alcohol use.  Has strong family history of high blood pressure.        Review of Systems   Constitutional:  Negative for chills, fever and unexpected weight change.   Respiratory:  Negative for shortness of breath.    Cardiovascular:  Negative for chest pain.       Current Outpatient Medications on File Prior to Visit   Medication  "Sig   • ketoconazole (NIZORAL) 2 % cream APPLY TOPICALLY DAILY AT NIGHT X 2 WEEKS   • ketoconazole (NIZORAL) 2 % shampoo PLEASE SEE ATTACHED FOR DETAILED DIRECTIONS   • spironolactone (ALDACTONE) 50 mg tablet Take 50 mg by mouth daily   • [DISCONTINUED] phentermine (ADIPEX-P) 37.5 MG tablet TAKE 1 TABLET BY MOUTH EVERY DAY BEFORE BREAKFAST AND HALF(1/2) TABLET AT LUNCH   • [DISCONTINUED] promethazine-dextromethorphan (PHENERGAN-DM) 6.25-15 mg/5 mL oral syrup Take 5 mL by mouth 4 (four) times a day as needed for cough       Objective     /90 (BP Location: Right arm, Patient Position: Sitting, Cuff Size: Large)   Pulse 102   Temp (!) 96.8 °F (36 °C) (Tympanic)   Resp 16   Ht 5' 1\" (1.549 m)   Wt 65 kg (143 lb 6.4 oz)   LMP 04/24/2024   SpO2 98%   BMI 27.10 kg/m²     Physical Exam  Vitals and nursing note reviewed.   Constitutional:       Appearance: Normal appearance.   HENT:      Head: Normocephalic and atraumatic.   Cardiovascular:      Rate and Rhythm: Normal rate and regular rhythm.      Pulses: Normal pulses.      Heart sounds: Normal heart sounds.   Pulmonary:      Effort: Pulmonary effort is normal.      Breath sounds: Normal breath sounds.   Neurological:      Mental Status: She is alert and oriented to person, place, and time. Mental status is at baseline.       Carli Brannon PA-C    "

## 2024-05-01 NOTE — ASSESSMENT & PLAN NOTE
4 pound weight gain since last visit.  Previously on and off phentermine and topiramate for weight management.  Recommend diet and exercise for weight instead.  Weight fluctuating.

## 2024-05-01 NOTE — ASSESSMENT & PLAN NOTE
Patient had elevated blood pressure at GYN office last fall, does not member how high.  Has a family history of high blood pressure.  Will request records from GYN office visit.  Went to [facility] yesterday and had /91 with machine.  In office today ranging high 130s/low 90s.  Suspect phentermine contributing to high blood pressure.  Takes intermittently when she gains weight.  Advised against taking phentermine and recommend DASH diet, low-salt diet, and exercise.  Follow-up as scheduled in July for blood pressure check.

## 2024-07-11 ENCOUNTER — OFFICE VISIT (OUTPATIENT)
Dept: FAMILY MEDICINE CLINIC | Facility: CLINIC | Age: 44
End: 2024-07-11
Payer: COMMERCIAL

## 2024-07-11 VITALS
OXYGEN SATURATION: 99 % | RESPIRATION RATE: 16 BRPM | HEART RATE: 86 BPM | TEMPERATURE: 97.8 F | SYSTOLIC BLOOD PRESSURE: 118 MMHG | DIASTOLIC BLOOD PRESSURE: 84 MMHG | WEIGHT: 141 LBS | BODY MASS INDEX: 26.62 KG/M2 | HEIGHT: 61 IN

## 2024-07-11 DIAGNOSIS — Z00.01 ENCOUNTER FOR GENERAL ADULT MEDICAL EXAMINATION WITH ABNORMAL FINDINGS: Primary | ICD-10-CM

## 2024-07-11 DIAGNOSIS — L30.9 ECZEMA OF BOTH UPPER EXTREMITIES: ICD-10-CM

## 2024-07-11 DIAGNOSIS — L81.1 MELASMA: ICD-10-CM

## 2024-07-11 DIAGNOSIS — Z12.4 CERVICAL CANCER SCREENING: ICD-10-CM

## 2024-07-11 DIAGNOSIS — L65.9 HAIR LOSS: ICD-10-CM

## 2024-07-11 PROCEDURE — 99396 PREV VISIT EST AGE 40-64: CPT | Performed by: FAMILY MEDICINE

## 2024-07-11 RX ORDER — HYDROQUINONE 40 MG/G
CREAM TOPICAL 2 TIMES DAILY
Qty: 35 G | Refills: 3 | Status: SHIPPED | OUTPATIENT
Start: 2024-07-11

## 2024-07-11 RX ORDER — PHENTERMINE HYDROCHLORIDE 37.5 MG/1
TABLET ORAL
COMMUNITY
Start: 2024-06-06 | End: 2024-07-11 | Stop reason: ALTCHOICE

## 2024-07-11 RX ORDER — MOMETASONE FUROATE 1 MG/G
CREAM TOPICAL DAILY
Qty: 45 G | Refills: 5 | Status: SHIPPED | OUTPATIENT
Start: 2024-07-11

## 2024-07-11 NOTE — PROGRESS NOTES
Name: Kristi Mayberry      : 1980      MRN: [id number]  Encounter Provider: Evan Dueñas MD  Encounter Date: 2024   Encounter department: [facility] PRIMARY CARE [facility]   Yearly Physical Follow-Up    Chief Complaint:  Yearly physical follow-up    History of Present Illness:  The patient presents for a yearly physical follow-up. She reports stable weight despite ongoing treatment and acknowledges the importance of exercise for long-term weight management. The patient mentions a history of elevated blood pressure but did not provide specific recent readings. She has been attempting dietary changes, including reducing bread and soda intake, but finds it challenging due to her work schedule and fatigue. The patient also discusses her use of spironolactone for hair loss, prescribed by a dermatologist, and expresses concern about persistent melasma, for which she has been using a topical cream recommended by her gynecologist. She inquires about further treatment options for melasma, including hydroquinone and laser therapy. Additionally, she reports intermittent skin lesions that worsen with sun exposure and heat, and she has been using sunscreen regularly.    Medications:  Spironolactone for hair loss  Topical cream for melasma (specific name not provided)    Allergies:  No known drug allergies    Past Medical History:  Hypertension  Melasma  Hair loss    Past Surgical History:  Not discussed    Social History:  The patient works physically demanding jobs, which contribute to her fatigue. She has two daughters and two grandchildren. She enjoys shopping at discount stores and has a history of inconsistent exercise habits.    Family History:  Not discussed    Review of Systems:  General: Reports stable weight, fatigue after work  Skin: Reports melasma and intermittent skin lesions exacerbated by sun exposure and heat  Cardiovascular: History of hypertension  Endocrine: No  new symptoms reported  Musculoskeletal: No new symptoms reported    Vital Signs:  Not provided in the transcription    Physical Exam:  Not provided in the transcription    Lab Results:  Not provided in the transcription    Imaging and Other Relevant Results:  Not provided in the transcription    Assessment and Plan:  Encounter for general adult medical examination with abnormal findings    Weight management: Reinforce the importance of regular physical activity and a balanced diet. Consider referral to a nutritionist for further dietary guidance.  Melasma: Prescribe hydroquinone 4% cream for melasma. Advise the patient to apply sunscreen daily and avoid sun exposure. Discuss the option of laser therapy at a specialized dermatology clinic if topical treatment is insufficient.  Hair loss: Continue spironolactone as prescribed by the dermatologist. Follow up with the dermatologist for ongoing management.  Skin lesions/Eczema of both upper extremities : Prescribe mometasone cream for intermittent skin lesions. Advise the patient to apply it once daily to affected areas and avoid sun exposure. Emphasize the importance of using sunscreen.  GYN referral.          Evan Dueñas MD   [facility] PRIMARY CARE [facility]

## 2024-07-17 ENCOUNTER — TELEPHONE (OUTPATIENT)
Dept: ADMINISTRATIVE | Facility: OTHER | Age: 44
End: 2024-07-17

## 2024-07-17 ENCOUNTER — TELEPHONE (OUTPATIENT)
Dept: OBGYN CLINIC | Facility: MEDICAL CENTER | Age: 44
End: 2024-07-17

## 2024-07-17 NOTE — TELEPHONE ENCOUNTER
----- Message from Nkechi JONES sent at 7/17/2024 10:14 AM EDT -----  Regarding: mammo  07/17/24 10:14 AM    Hello, our patient Kristi Mayberry has had Mammogram completed/performed. Please assist in updating the patient chart by pulling the document from the external Imaging/Procedure Tab. The date of service is 7/2024.     Thank you,  Nkechi Dueñas, RN  [facility]  PRIMARY CARE [facility]

## 2024-07-17 NOTE — TELEPHONE ENCOUNTER
Upon review of the In Basket request we were able to locate, review, and update the patient chart as requested for Mammogram.    Any additional questions or concerns should be emailed to the Practice Liaisons via the appropriate education email address, please do not reply via In Basket.    Thank you  Rosa Rae MA   [facility] VALUE BASED VIR

## 2024-10-04 NOTE — PROGRESS NOTES
Assessment/Plan:    BMI 26 0-26 9,adult  Kristi reports that she gained some weight during the pandemic, and has been more sedentary than in the past  Her gynecologist has been prescribing phentermine for her for 2-3 years to help with weight loss  She has been taking it intermittently (she will take it regularly for a few months, then stop for a few months, then start again) and reports that whenever she stops taking it, she gains the weight back that she lost while taking the medication  Her gynecologist has also recently started her on topiramate, also to help with weight loss; she has been taking the topiramate for two months  She reports that she is tolerating both medications well and is having no side effects  We discussed the use of phentermine and its addictive nature  It is not a good long-term solution for weight loss; lifestyle modifications are the mainstay of treatment  I do not recommend the use of phentermine for more than a few months  Implementing a healthy diet and getting regular exercise is the best and most healthy way to lose weight and keep it off  We discussed healthy eating strategies, as well as the importance of exercise for both physical and mental health  She verbalized understanding and will try to make some good lifestyle changes to lose weight naturally  Alopecia  Kristi has noticed thinning of hair at the crown for the past few years  Her gynecologist took a biopsy and has ordered labs to try to determine the cause of hair loss  The labs that I am able to see include DHEA, testosterone, FSH, LH, and TSH, and all are within normal limits  I will hold off on pursuing additional workup or initiating treatment, as she is following with her gynecologist regarding this problem  However, I encouraged Kristi to feel free to reach out to our office at any point if she wants to, as I would be happy to offer suggestions for further lab testing or treatment options       Right hand paresthesia  Kristi has occasional numbness, tingling, and a sensation of cramping/muscle spasm in her R hand  The pain does not bother her while she is sleeping, and she denies weakness  She does a lot of work with her hands for work, and does many repetitive movements with her hands and wrists  She is right-handed  I recommended trying to limit the movements that exacerbate her symptoms as much as possible  She can take OTC ibuprofen or naproxen to help reduce inflammation from overuse  If her symptoms worsen, consider nerve conduction studies to confirm diagnosis of carpal tunnel and referral to orthopedics  Chronic idiopathic constipation  Kristi reports that she only has a bowel movement about once a week  This is how she has always been, and it does not cause her any distress  She takes fiber gummies as needed, which help  She admits that she probably does not get enough fiber in her diet or drink enough water  I recommended increasing intake of vegetables, increasing water intake, and getting regular exercise to help her move her bowels more regularly  Diagnoses and all orders for this visit:    Encounter for general adult medical examination with abnormal findings    BMI 26 0-26 9,adult    Right hand paresthesia    Alopecia    Chronic idiopathic constipation    Other orders  -     Cancel: Mammo screening bilateral w 3d & cad; Future  -     topiramate (TOPAMAX) 25 mg tablet; TAKE 1 TABLET BY MOUTH 2 TIMES EVERY DAY IN THE MORNING AND EVENING          Subjective:      Patient ID: August Morel is a 36 y o  female  Iris presents to the office today for annual physical exam  She has no acute complaints         The following portions of the patient's history were reviewed and updated as appropriate: allergies, current medications, past family history, past medical history, past social history, past surgical history and problem list     Review of Systems   Constitutional: Negative for appetite change, chills, fatigue, fever and unexpected weight change  HENT: Negative for congestion, postnasal drip, rhinorrhea, sinus pressure, sinus pain, sore throat and trouble swallowing  Respiratory: Negative for cough, chest tightness, shortness of breath, wheezing and stridor  Cardiovascular: Negative for chest pain, palpitations and leg swelling  Gastrointestinal: Positive for constipation (chronic)  Negative for abdominal pain, blood in stool, diarrhea, nausea and vomiting  Musculoskeletal: Positive for arthralgias (R wrist)  Negative for gait problem, neck pain and neck stiffness  Neurological: Positive for numbness (R hand)  Negative for dizziness, seizures, syncope, speech difficulty, weakness and headaches  Psychiatric/Behavioral: Negative for sleep disturbance  The patient is not nervous/anxious  Objective:      /80 (BP Location: Left arm, Patient Position: Sitting, Cuff Size: Standard)   Pulse 68   Temp 97 9 °F (36 6 °C) (Temporal)   Resp 16   Ht 5' 1" (1 549 m)   Wt 63 kg (139 lb)   SpO2 99%   Breastfeeding No   BMI 26 26 kg/m²          Physical Exam  Vitals signs reviewed  Constitutional:       General: She is not in acute distress  Appearance: Normal appearance  She is not toxic-appearing  HENT:      Head: Normocephalic and atraumatic  Right Ear: Tympanic membrane normal       Left Ear: Tympanic membrane normal       Nose: Nose normal  No congestion  Mouth/Throat:      Mouth: Mucous membranes are moist       Pharynx: Oropharynx is clear  Eyes:      Extraocular Movements: Extraocular movements intact  Conjunctiva/sclera: Conjunctivae normal       Pupils: Pupils are equal, round, and reactive to light  Neck:      Musculoskeletal: Normal range of motion  No neck rigidity or muscular tenderness  Vascular: No carotid bruit  Cardiovascular:      Rate and Rhythm: Normal rate and regular rhythm  Pulses: Normal pulses        Heart sounds: Normal heart sounds  No murmur  No friction rub  No gallop  Pulmonary:      Effort: Pulmonary effort is normal  No respiratory distress  Breath sounds: Normal breath sounds  No stridor  No wheezing, rhonchi or rales  Abdominal:      General: Bowel sounds are normal  There is no distension  Palpations: Abdomen is soft  Tenderness: There is no abdominal tenderness  There is no guarding  Musculoskeletal: Normal range of motion  Right lower leg: No edema  Left lower leg: No edema  Lymphadenopathy:      Cervical: No cervical adenopathy  Skin:     General: Skin is warm and dry  Neurological:      Mental Status: She is alert and oriented to person, place, and time  Motor: No weakness  Coordination: Coordination normal       Gait: Gait normal    Psychiatric:         Mood and Affect: Mood normal          Behavior: Behavior normal          Thought Content: Thought content normal            BMI Counseling: Body mass index is 26 26 kg/m²  The BMI is above normal  Nutrition recommendations include decreasing overall calorie intake, 3-5 servings of fruits/vegetables daily, consuming healthier snacks and moderation in carbohydrate intake  Exercise recommendations include exercising 3-5 times per week  98.4

## 2024-10-23 ENCOUNTER — LAB (OUTPATIENT)
Dept: LAB | Facility: MEDICAL CENTER | Age: 44
End: 2024-10-23
Payer: COMMERCIAL

## 2024-10-23 DIAGNOSIS — Z00.01 ENCOUNTER FOR GENERAL ADULT MEDICAL EXAMINATION WITH ABNORMAL FINDINGS: ICD-10-CM

## 2024-10-23 LAB
ALBUMIN SERPL BCG-MCNC: 4.3 G/DL (ref 3.5–5)
ALP SERPL-CCNC: 57 U/L (ref 34–104)
ALT SERPL W P-5'-P-CCNC: 13 U/L (ref 7–52)
ANION GAP SERPL CALCULATED.3IONS-SCNC: 12 MMOL/L (ref 4–13)
AST SERPL W P-5'-P-CCNC: 16 U/L (ref 13–39)
BASOPHILS # BLD AUTO: 0.03 THOUSANDS/ΜL (ref 0–0.1)
BASOPHILS NFR BLD AUTO: 0 % (ref 0–1)
BILIRUB SERPL-MCNC: 0.62 MG/DL (ref 0.2–1)
BUN SERPL-MCNC: 12 MG/DL (ref 5–25)
CALCIUM SERPL-MCNC: 9 MG/DL (ref 8.4–10.2)
CHLORIDE SERPL-SCNC: 102 MMOL/L (ref 96–108)
CHOLEST SERPL-MCNC: 180 MG/DL
CO2 SERPL-SCNC: 23 MMOL/L (ref 21–32)
CREAT SERPL-MCNC: 0.74 MG/DL (ref 0.6–1.3)
EOSINOPHIL # BLD AUTO: 0.06 THOUSAND/ΜL (ref 0–0.61)
EOSINOPHIL NFR BLD AUTO: 1 % (ref 0–6)
ERYTHROCYTE [DISTWIDTH] IN BLOOD BY AUTOMATED COUNT: 12.4 % (ref 11.6–15.1)
GFR SERPL CREATININE-BSD FRML MDRD: 98 ML/MIN/1.73SQ M
GLUCOSE SERPL-MCNC: 68 MG/DL (ref 65–140)
HCT VFR BLD AUTO: 42.7 % (ref 34.8–46.1)
HDLC SERPL-MCNC: 53 MG/DL
HGB BLD-MCNC: 13.8 G/DL (ref 11.5–15.4)
IMM GRANULOCYTES # BLD AUTO: 0.02 THOUSAND/UL (ref 0–0.2)
IMM GRANULOCYTES NFR BLD AUTO: 0 % (ref 0–2)
LDLC SERPL CALC-MCNC: 107 MG/DL (ref 0–100)
LYMPHOCYTES # BLD AUTO: 2.47 THOUSANDS/ΜL (ref 0.6–4.47)
LYMPHOCYTES NFR BLD AUTO: 26 % (ref 14–44)
MCH RBC QN AUTO: 29.6 PG (ref 26.8–34.3)
MCHC RBC AUTO-ENTMCNC: 32.3 G/DL (ref 31.4–37.4)
MCV RBC AUTO: 92 FL (ref 82–98)
MONOCYTES # BLD AUTO: 0.45 THOUSAND/ΜL (ref 0.17–1.22)
MONOCYTES NFR BLD AUTO: 5 % (ref 4–12)
NEUTROPHILS # BLD AUTO: 6.35 THOUSANDS/ΜL (ref 1.85–7.62)
NEUTS SEG NFR BLD AUTO: 68 % (ref 43–75)
NRBC BLD AUTO-RTO: 0 /100 WBCS
PLATELET # BLD AUTO: 281 THOUSANDS/UL (ref 149–390)
PMV BLD AUTO: 10.5 FL (ref 8.9–12.7)
POTASSIUM SERPL-SCNC: 3.7 MMOL/L (ref 3.5–5.3)
PROT SERPL-MCNC: 7.3 G/DL (ref 6.4–8.4)
RBC # BLD AUTO: 4.66 MILLION/UL (ref 3.81–5.12)
SODIUM SERPL-SCNC: 137 MMOL/L (ref 135–147)
TRIGL SERPL-MCNC: 102 MG/DL
WBC # BLD AUTO: 9.38 THOUSAND/UL (ref 4.31–10.16)

## 2024-10-23 PROCEDURE — 80053 COMPREHEN METABOLIC PANEL: CPT

## 2024-10-23 PROCEDURE — 36415 COLL VENOUS BLD VENIPUNCTURE: CPT

## 2024-10-23 PROCEDURE — 80061 LIPID PANEL: CPT

## 2024-10-23 PROCEDURE — 85025 COMPLETE CBC W/AUTO DIFF WBC: CPT

## 2024-10-25 ENCOUNTER — TELEPHONE (OUTPATIENT)
Age: 44
End: 2024-10-25

## 2024-10-25 NOTE — TELEPHONE ENCOUNTER
Pt just lost her father in law last week to cancer. She has been experiencing extreme stress the past month and now she is having very bad head pain/pressure and pressure all over her body. She cannot describe well how she feels. She also lost some patches of hair. There are no openings with Dr. Dueñas today. She is scheduled for 11/7 but would like to know if theres anything we can do to help her now/any openings or cancellations with Dr. Dueñas sooner. She is also feeling fearful for her own health now because of her father in laws cancer. She had labs drawn recently, please review and let pt know of results.

## 2025-07-17 ENCOUNTER — TELEPHONE (OUTPATIENT)
Dept: ADMINISTRATIVE | Facility: OTHER | Age: 45
End: 2025-07-17

## 2025-07-17 NOTE — TELEPHONE ENCOUNTER
----- Message from Nkechi JONES sent at 7/16/2025  8:52 AM EDT -----  Regarding: pap  07/16/25 8:52 AM    Hello, our patient Kristi Mayberry has had Pap Smear (HPV) aka Cervical Cancer Screening completed/performed. Please assist in updating the patient chart by pulling the document from lab Tab within Chart Review. The date of service is 11/2024.     Thank you,  Nkechi Dueñas, RN  [facility]  PRIMARY CARE [facility]

## 2025-07-21 NOTE — TELEPHONE ENCOUNTER
Upon review of the In Basket request we were able to locate, review, and update the patient chart as requested for Pap Smear (HPV) aka Cervical Cancer Screening.    Any additional questions or concerns should be emailed to the Practice Liaisons via the appropriate education email address, please do not reply via In Basket.    Thank you  Chely Ervin MA   [facility] V[facility]